# Patient Record
Sex: MALE | Race: WHITE | NOT HISPANIC OR LATINO
[De-identification: names, ages, dates, MRNs, and addresses within clinical notes are randomized per-mention and may not be internally consistent; named-entity substitution may affect disease eponyms.]

---

## 2017-01-12 ENCOUNTER — NON-APPOINTMENT (OUTPATIENT)
Age: 62
End: 2017-01-12

## 2017-01-12 ENCOUNTER — APPOINTMENT (OUTPATIENT)
Dept: CARDIOLOGY | Facility: CLINIC | Age: 62
End: 2017-01-12

## 2017-01-12 VITALS
BODY MASS INDEX: 30.61 KG/M2 | DIASTOLIC BLOOD PRESSURE: 80 MMHG | TEMPERATURE: 98.7 F | SYSTOLIC BLOOD PRESSURE: 124 MMHG | HEIGHT: 67 IN | OXYGEN SATURATION: 96 % | WEIGHT: 195 LBS | HEART RATE: 85 BPM

## 2017-01-12 RX ORDER — LEVETIRACETAM 500 MG/1
500 TABLET, FILM COATED ORAL TWICE DAILY
Qty: 180 | Refills: 3 | Status: DISCONTINUED | COMMUNITY
Start: 2017-01-12 | End: 2017-01-12

## 2017-01-16 LAB
ALBUMIN SERPL ELPH-MCNC: 4.2 G/DL
ALP BLD-CCNC: 77 U/L
ALT SERPL-CCNC: 15 U/L
ANION GAP SERPL CALC-SCNC: 12 MMOL/L
AST SERPL-CCNC: 19 U/L
BASOPHILS # BLD AUTO: 0 K/UL
BASOPHILS NFR BLD AUTO: 0 %
BILIRUB SERPL-MCNC: 0.3 MG/DL
BUN SERPL-MCNC: 18 MG/DL
CALCIUM SERPL-MCNC: 9.1 MG/DL
CHLORIDE SERPL-SCNC: 104 MMOL/L
CHOLEST SERPL-MCNC: 233 MG/DL
CHOLEST/HDLC SERPL: 3.3 RATIO
CO2 SERPL-SCNC: 26 MMOL/L
CREAT SERPL-MCNC: 1 MG/DL
EOSINOPHIL # BLD AUTO: 0.06 K/UL
EOSINOPHIL NFR BLD AUTO: 1.4 %
GLUCOSE SERPL-MCNC: 103 MG/DL
HBA1C MFR BLD HPLC: 5.5 %
HCT VFR BLD CALC: 43 %
HDLC SERPL-MCNC: 71 MG/DL
HGB BLD-MCNC: 14.4 G/DL
IMM GRANULOCYTES NFR BLD AUTO: 0 %
LDLC SERPL CALC-MCNC: 134 MG/DL
LYMPHOCYTES # BLD AUTO: 0.57 K/UL
LYMPHOCYTES NFR BLD AUTO: 13.6 %
MAN DIFF?: NORMAL
MCHC RBC-ENTMCNC: 30.7 PG
MCHC RBC-ENTMCNC: 33.5 GM/DL
MCV RBC AUTO: 91.7 FL
MONOCYTES # BLD AUTO: 0.35 K/UL
MONOCYTES NFR BLD AUTO: 8.3 %
NEUTROPHILS # BLD AUTO: 3.22 K/UL
NEUTROPHILS NFR BLD AUTO: 76.7 %
PLATELET # BLD AUTO: 143 K/UL
POTASSIUM SERPL-SCNC: 4.2 MMOL/L
PROT SERPL-MCNC: 6.8 G/DL
PSA FREE FLD-MCNC: 14.7 %
PSA FREE SERPL-MCNC: 0.09 NG/ML
PSA SERPL-MCNC: 0.63 NG/ML
RBC # BLD: 4.69 M/UL
RBC # FLD: 13.4 %
SODIUM SERPL-SCNC: 142 MMOL/L
T4 SERPL-MCNC: 6.8 UG/DL
TRIGL SERPL-MCNC: 139 MG/DL
TSH SERPL-ACNC: 2.66 UIU/ML
WBC # FLD AUTO: 4.2 K/UL

## 2017-03-20 ENCOUNTER — APPOINTMENT (OUTPATIENT)
Dept: INFECTIOUS DISEASE | Facility: CLINIC | Age: 62
End: 2017-03-20

## 2017-03-20 VITALS
TEMPERATURE: 98.2 F | DIASTOLIC BLOOD PRESSURE: 94 MMHG | BODY MASS INDEX: 30.29 KG/M2 | SYSTOLIC BLOOD PRESSURE: 148 MMHG | HEIGHT: 67 IN | OXYGEN SATURATION: 100 % | WEIGHT: 193 LBS | HEART RATE: 87 BPM

## 2017-03-24 LAB
ALBUMIN SERPL ELPH-MCNC: 4.1 G/DL
ALP BLD-CCNC: 76 U/L
ALT SERPL-CCNC: 12 U/L
ANION GAP SERPL CALC-SCNC: 17 MMOL/L
AST SERPL-CCNC: 16 U/L
BASOPHILS # BLD AUTO: 0.01 K/UL
BASOPHILS NFR BLD AUTO: 0.2 %
BILIRUB SERPL-MCNC: 0.3 MG/DL
BUN SERPL-MCNC: 21 MG/DL
CALCIUM SERPL-MCNC: 9.1 MG/DL
CHLORIDE SERPL-SCNC: 100 MMOL/L
CO2 SERPL-SCNC: 22 MMOL/L
CREAT SERPL-MCNC: 1.21 MG/DL
EOSINOPHIL # BLD AUTO: 0.09 K/UL
EOSINOPHIL NFR BLD AUTO: 1.7 %
GLUCOSE SERPL-MCNC: 95 MG/DL
HBV CORE IGG+IGM SER QL: REACTIVE
HBV SURFACE AB SER QL: NONREACTIVE
HBV SURFACE AG SER QL: NONREACTIVE
HCT VFR BLD CALC: 44.6 %
HCV AB SER QL: NONREACTIVE
HCV S/CO RATIO: 0.07 S/CO
HGB BLD-MCNC: 14.5 G/DL
HIV1+2 AB SPEC QL IA.RAPID: NONREACTIVE
IMM GRANULOCYTES NFR BLD AUTO: 0.4 %
INR PPP: 0.96 RATIO
LYMPHOCYTES # BLD AUTO: 0.56 K/UL
LYMPHOCYTES NFR BLD AUTO: 10.4 %
MAN DIFF?: NORMAL
MCHC RBC-ENTMCNC: 29.9 PG
MCHC RBC-ENTMCNC: 32.5 GM/DL
MCV RBC AUTO: 92 FL
MONOCYTES # BLD AUTO: 0.61 K/UL
MONOCYTES NFR BLD AUTO: 11.3 %
NEUTROPHILS # BLD AUTO: 4.1 K/UL
NEUTROPHILS NFR BLD AUTO: 76 %
PLATELET # BLD AUTO: 195 K/UL
POTASSIUM SERPL-SCNC: 4.1 MMOL/L
PROT SERPL-MCNC: 6.8 G/DL
PT BLD: 10.8 SEC
RBC # BLD: 4.85 M/UL
RBC # FLD: 13.7 %
RPR SER-TITR: ABNORMAL
SODIUM SERPL-SCNC: 139 MMOL/L
WBC # FLD AUTO: 5.39 K/UL

## 2017-03-28 ENCOUNTER — FORM ENCOUNTER (OUTPATIENT)
Age: 62
End: 2017-03-28

## 2017-03-29 ENCOUNTER — OUTPATIENT (OUTPATIENT)
Dept: OUTPATIENT SERVICES | Facility: HOSPITAL | Age: 62
LOS: 1 days | End: 2017-03-29
Payer: COMMERCIAL

## 2017-03-29 ENCOUNTER — APPOINTMENT (OUTPATIENT)
Dept: RADIOLOGY | Facility: HOSPITAL | Age: 62
End: 2017-03-29

## 2017-03-29 DIAGNOSIS — A52.3 NEUROSYPHILIS, UNSPECIFIED: ICD-10-CM

## 2017-03-29 LAB
APPEARANCE CSF: CLEAR — SIGNIFICANT CHANGE UP
COLOR CSF: SIGNIFICANT CHANGE UP
GLUCOSE CSF-MCNC: 55 MG/DL — SIGNIFICANT CHANGE UP (ref 40–70)
GRAM STN FLD: SIGNIFICANT CHANGE UP
LYMPHOCYTES # CSF: 87 % — HIGH (ref 40–80)
MONOS+MACROS NFR CSF: 13 % — LOW (ref 15–45)
NEUTROPHILS # CSF: 0 % — SIGNIFICANT CHANGE UP (ref 0–6)
NRBC NFR CSF: 1 /UL — SIGNIFICANT CHANGE UP (ref 0–5)
PROT CSF-MCNC: 48 MG/DL — HIGH (ref 15–45)
RBC # CSF: 0 /UL — SIGNIFICANT CHANGE UP (ref 0–0)
SPECIMEN SOURCE: SIGNIFICANT CHANGE UP
TUBE TYPE: SIGNIFICANT CHANGE UP

## 2017-03-29 PROCEDURE — 82945 GLUCOSE OTHER FLUID: CPT

## 2017-03-29 PROCEDURE — 89051 BODY FLUID CELL COUNT: CPT

## 2017-03-29 PROCEDURE — 87205 SMEAR GRAM STAIN: CPT

## 2017-03-29 PROCEDURE — 77003 FLUOROGUIDE FOR SPINE INJECT: CPT | Mod: 26

## 2017-03-29 PROCEDURE — 77003 FLUOROGUIDE FOR SPINE INJECT: CPT

## 2017-03-29 PROCEDURE — 87070 CULTURE OTHR SPECIMN AEROBIC: CPT

## 2017-03-29 PROCEDURE — 84157 ASSAY OF PROTEIN OTHER: CPT

## 2017-03-29 PROCEDURE — 62270 DX LMBR SPI PNXR: CPT

## 2017-03-29 PROCEDURE — 86592 SYPHILIS TEST NON-TREP QUAL: CPT

## 2017-03-30 LAB — VDRL CSF-TITR: POSITIVE

## 2017-04-01 LAB
CULTURE RESULTS: NO GROWTH — SIGNIFICANT CHANGE UP
SPECIMEN SOURCE: SIGNIFICANT CHANGE UP

## 2017-06-20 ENCOUNTER — APPOINTMENT (OUTPATIENT)
Dept: INFECTIOUS DISEASE | Facility: CLINIC | Age: 62
End: 2017-06-20

## 2017-06-20 VITALS
WEIGHT: 193 LBS | HEIGHT: 67 IN | BODY MASS INDEX: 30.29 KG/M2 | SYSTOLIC BLOOD PRESSURE: 116 MMHG | TEMPERATURE: 97.9 F | HEART RATE: 91 BPM | DIASTOLIC BLOOD PRESSURE: 84 MMHG | OXYGEN SATURATION: 99 %

## 2017-06-23 LAB
HBV DNA # SERPL NAA+PROBE: NOT DETECTED IU/ML
HEPB DNA PCR LOG: NOT DETECTED LOGIU/ML
RPR SER-TITR: ABNORMAL

## 2017-09-18 ENCOUNTER — APPOINTMENT (OUTPATIENT)
Dept: INFECTIOUS DISEASE | Facility: CLINIC | Age: 62
End: 2017-09-18
Payer: COMMERCIAL

## 2017-09-18 VITALS
SYSTOLIC BLOOD PRESSURE: 144 MMHG | BODY MASS INDEX: 29.82 KG/M2 | OXYGEN SATURATION: 100 % | HEIGHT: 67 IN | TEMPERATURE: 97.2 F | DIASTOLIC BLOOD PRESSURE: 81 MMHG | WEIGHT: 190 LBS | HEART RATE: 78 BPM

## 2017-09-18 PROCEDURE — 99213 OFFICE O/P EST LOW 20 MIN: CPT

## 2017-10-05 LAB — RPR SER-TITR: ABNORMAL

## 2017-12-19 ENCOUNTER — APPOINTMENT (OUTPATIENT)
Dept: INFECTIOUS DISEASE | Facility: CLINIC | Age: 62
End: 2017-12-19
Payer: COMMERCIAL

## 2017-12-19 VITALS
HEART RATE: 97 BPM | RESPIRATION RATE: 18 BRPM | OXYGEN SATURATION: 100 % | TEMPERATURE: 97 F | BODY MASS INDEX: 29.03 KG/M2 | WEIGHT: 185 LBS | HEIGHT: 67 IN | DIASTOLIC BLOOD PRESSURE: 98 MMHG | SYSTOLIC BLOOD PRESSURE: 147 MMHG

## 2017-12-19 PROCEDURE — 99213 OFFICE O/P EST LOW 20 MIN: CPT

## 2017-12-21 ENCOUNTER — MOBILE ON CALL (OUTPATIENT)
Age: 62
End: 2017-12-21

## 2017-12-22 LAB — RPR SER-TITR: ABNORMAL

## 2018-01-08 ENCOUNTER — APPOINTMENT (OUTPATIENT)
Dept: CARDIOLOGY | Facility: CLINIC | Age: 63
End: 2018-01-08
Payer: COMMERCIAL

## 2018-01-08 ENCOUNTER — NON-APPOINTMENT (OUTPATIENT)
Age: 63
End: 2018-01-08

## 2018-01-08 VITALS
DIASTOLIC BLOOD PRESSURE: 83 MMHG | SYSTOLIC BLOOD PRESSURE: 137 MMHG | BODY MASS INDEX: 29.45 KG/M2 | WEIGHT: 188 LBS | HEART RATE: 83 BPM

## 2018-01-08 PROCEDURE — 99214 OFFICE O/P EST MOD 30 MIN: CPT

## 2018-01-08 PROCEDURE — 93000 ELECTROCARDIOGRAM COMPLETE: CPT

## 2018-01-09 LAB
ALBUMIN SERPL ELPH-MCNC: 4.5 G/DL
ALP BLD-CCNC: 94 U/L
ALT SERPL-CCNC: 22 U/L
ANION GAP SERPL CALC-SCNC: 18 MMOL/L
AST SERPL-CCNC: 26 U/L
BASOPHILS # BLD AUTO: 0.01 K/UL
BASOPHILS NFR BLD AUTO: 0.2 %
BILIRUB SERPL-MCNC: 0.3 MG/DL
BUN SERPL-MCNC: 16 MG/DL
CALCIUM SERPL-MCNC: 9.8 MG/DL
CHLORIDE SERPL-SCNC: 99 MMOL/L
CHOLEST SERPL-MCNC: 228 MG/DL
CHOLEST/HDLC SERPL: 3 RATIO
CO2 SERPL-SCNC: 23 MMOL/L
CREAT SERPL-MCNC: 1.13 MG/DL
EOSINOPHIL # BLD AUTO: 0.07 K/UL
EOSINOPHIL NFR BLD AUTO: 1.4 %
ESTIMATED AVERAGE GLUCOSE: 103 MG/DL
GLUCOSE SERPL-MCNC: 92 MG/DL
HBA1C MFR BLD HPLC: 5.2 %
HCT VFR BLD CALC: 44.6 %
HDLC SERPL-MCNC: 77 MG/DL
HGB BLD-MCNC: 14.7 G/DL
IMM GRANULOCYTES NFR BLD AUTO: 0.2 %
LDLC SERPL CALC-MCNC: 121 MG/DL
LYMPHOCYTES # BLD AUTO: 0.65 K/UL
LYMPHOCYTES NFR BLD AUTO: 13.1 %
MAN DIFF?: NORMAL
MCHC RBC-ENTMCNC: 30.7 PG
MCHC RBC-ENTMCNC: 33 GM/DL
MCV RBC AUTO: 93.1 FL
MONOCYTES # BLD AUTO: 0.63 K/UL
MONOCYTES NFR BLD AUTO: 12.7 %
NEUTROPHILS # BLD AUTO: 3.61 K/UL
NEUTROPHILS NFR BLD AUTO: 72.4 %
PLATELET # BLD AUTO: 175 K/UL
POTASSIUM SERPL-SCNC: 4.4 MMOL/L
PROT SERPL-MCNC: 7.2 G/DL
PSA FREE FLD-MCNC: 14.3
PSA FREE SERPL-MCNC: 0.11 NG/ML
PSA SERPL-MCNC: 0.77 NG/ML
RBC # BLD: 4.79 M/UL
RBC # FLD: 13.1 %
SODIUM SERPL-SCNC: 140 MMOL/L
T4 SERPL-MCNC: 7.4 UG/DL
TRIGL SERPL-MCNC: 148 MG/DL
TSH SERPL-ACNC: 2.84 UIU/ML
WBC # FLD AUTO: 4.98 K/UL

## 2018-06-11 ENCOUNTER — APPOINTMENT (OUTPATIENT)
Dept: INFECTIOUS DISEASE | Facility: CLINIC | Age: 63
End: 2018-06-11
Payer: COMMERCIAL

## 2018-06-11 VITALS
SYSTOLIC BLOOD PRESSURE: 143 MMHG | HEIGHT: 67 IN | HEART RATE: 82 BPM | DIASTOLIC BLOOD PRESSURE: 89 MMHG | WEIGHT: 184 LBS | OXYGEN SATURATION: 100 % | TEMPERATURE: 97.4 F | BODY MASS INDEX: 28.88 KG/M2

## 2018-06-11 PROCEDURE — 99213 OFFICE O/P EST LOW 20 MIN: CPT

## 2018-06-15 LAB — RPR SER-TITR: ABNORMAL

## 2018-07-10 ENCOUNTER — INPATIENT (INPATIENT)
Facility: HOSPITAL | Age: 63
LOS: 7 days | Discharge: HOME | DRG: 494 | End: 2018-07-18
Attending: ORTHOPAEDIC SURGERY | Admitting: ORTHOPAEDIC SURGERY
Payer: COMMERCIAL

## 2018-07-10 VITALS
OXYGEN SATURATION: 100 % | SYSTOLIC BLOOD PRESSURE: 153 MMHG | WEIGHT: 184.97 LBS | HEIGHT: 68 IN | RESPIRATION RATE: 17 BRPM | DIASTOLIC BLOOD PRESSURE: 99 MMHG | HEART RATE: 89 BPM | TEMPERATURE: 98 F

## 2018-07-10 DIAGNOSIS — S82.143A DISPLACED BICONDYLAR FRACTURE OF UNSPECIFIED TIBIA, INITIAL ENCOUNTER FOR CLOSED FRACTURE: ICD-10-CM

## 2018-07-10 LAB
ALBUMIN SERPL ELPH-MCNC: 3.8 G/DL — SIGNIFICANT CHANGE UP (ref 3.3–5)
ALP SERPL-CCNC: 72 U/L — SIGNIFICANT CHANGE UP (ref 40–120)
ALT FLD-CCNC: 15 U/L — SIGNIFICANT CHANGE UP (ref 10–45)
ANION GAP SERPL CALC-SCNC: 10 MMOL/L — SIGNIFICANT CHANGE UP (ref 5–17)
APTT BLD: 26.5 SEC — LOW (ref 27.5–37.4)
AST SERPL-CCNC: 24 U/L — SIGNIFICANT CHANGE UP (ref 10–40)
BASOPHILS # BLD AUTO: 0 K/UL — SIGNIFICANT CHANGE UP (ref 0–0.2)
BASOPHILS NFR BLD AUTO: 0 % — SIGNIFICANT CHANGE UP (ref 0–2)
BILIRUB SERPL-MCNC: 0.5 MG/DL — SIGNIFICANT CHANGE UP (ref 0.2–1.2)
BLD GP AB SCN SERPL QL: NEGATIVE — SIGNIFICANT CHANGE UP
BUN SERPL-MCNC: 22 MG/DL — SIGNIFICANT CHANGE UP (ref 7–23)
CALCIUM SERPL-MCNC: 9.2 MG/DL — SIGNIFICANT CHANGE UP (ref 8.4–10.5)
CHLORIDE SERPL-SCNC: 104 MMOL/L — SIGNIFICANT CHANGE UP (ref 96–108)
CK SERPL-CCNC: 344 U/L — HIGH (ref 30–200)
CO2 SERPL-SCNC: 27 MMOL/L — SIGNIFICANT CHANGE UP (ref 22–31)
CREAT SERPL-MCNC: 1.08 MG/DL — SIGNIFICANT CHANGE UP (ref 0.5–1.3)
EOSINOPHIL # BLD AUTO: 0 K/UL — SIGNIFICANT CHANGE UP (ref 0–0.5)
EOSINOPHIL NFR BLD AUTO: 0.4 % — SIGNIFICANT CHANGE UP (ref 0–6)
GAS PNL BLDV: SIGNIFICANT CHANGE UP
GLUCOSE SERPL-MCNC: 113 MG/DL — HIGH (ref 70–99)
HCT VFR BLD CALC: 36.2 % — LOW (ref 39–50)
HGB BLD-MCNC: 12.7 G/DL — LOW (ref 13–17)
INR BLD: 1.05 RATIO — SIGNIFICANT CHANGE UP (ref 0.88–1.16)
LYMPHOCYTES # BLD AUTO: 0.4 K/UL — LOW (ref 1–3.3)
LYMPHOCYTES # BLD AUTO: 4.9 % — LOW (ref 13–44)
MCHC RBC-ENTMCNC: 32.9 PG — SIGNIFICANT CHANGE UP (ref 27–34)
MCHC RBC-ENTMCNC: 35.2 GM/DL — SIGNIFICANT CHANGE UP (ref 32–36)
MCV RBC AUTO: 93.5 FL — SIGNIFICANT CHANGE UP (ref 80–100)
MONOCYTES # BLD AUTO: 0.7 K/UL — SIGNIFICANT CHANGE UP (ref 0–0.9)
MONOCYTES NFR BLD AUTO: 8.5 % — SIGNIFICANT CHANGE UP (ref 2–14)
NEUTROPHILS # BLD AUTO: 6.8 K/UL — SIGNIFICANT CHANGE UP (ref 1.8–7.4)
NEUTROPHILS NFR BLD AUTO: 86.2 % — HIGH (ref 43–77)
PLATELET # BLD AUTO: 122 K/UL — LOW (ref 150–400)
POTASSIUM SERPL-MCNC: 4 MMOL/L — SIGNIFICANT CHANGE UP (ref 3.5–5.3)
POTASSIUM SERPL-SCNC: 4 MMOL/L — SIGNIFICANT CHANGE UP (ref 3.5–5.3)
PROT SERPL-MCNC: 6.7 G/DL — SIGNIFICANT CHANGE UP (ref 6–8.3)
PROTHROM AB SERPL-ACNC: 11.5 SEC — SIGNIFICANT CHANGE UP (ref 9.8–12.7)
RBC # BLD: 3.87 M/UL — LOW (ref 4.2–5.8)
RBC # FLD: 11.9 % — SIGNIFICANT CHANGE UP (ref 10.3–14.5)
RH IG SCN BLD-IMP: POSITIVE — SIGNIFICANT CHANGE UP
SODIUM SERPL-SCNC: 141 MMOL/L — SIGNIFICANT CHANGE UP (ref 135–145)
WBC # BLD: 7.9 K/UL — SIGNIFICANT CHANGE UP (ref 3.8–10.5)
WBC # FLD AUTO: 7.9 K/UL — SIGNIFICANT CHANGE UP (ref 3.8–10.5)

## 2018-07-10 PROCEDURE — 73562 X-RAY EXAM OF KNEE 3: CPT | Mod: 26,LT

## 2018-07-10 PROCEDURE — 99284 EMERGENCY DEPT VISIT MOD MDM: CPT | Mod: 25

## 2018-07-10 PROCEDURE — 76377 3D RENDER W/INTRP POSTPROCES: CPT | Mod: 26

## 2018-07-10 PROCEDURE — 73590 X-RAY EXAM OF LOWER LEG: CPT | Mod: 26,LT

## 2018-07-10 PROCEDURE — 73502 X-RAY EXAM HIP UNI 2-3 VIEWS: CPT | Mod: 26,LT

## 2018-07-10 PROCEDURE — 73630 X-RAY EXAM OF FOOT: CPT | Mod: 26,LT

## 2018-07-10 PROCEDURE — 73610 X-RAY EXAM OF ANKLE: CPT | Mod: 26,LT

## 2018-07-10 PROCEDURE — 73700 CT LOWER EXTREMITY W/O DYE: CPT | Mod: 26,LT

## 2018-07-10 PROCEDURE — 73552 X-RAY EXAM OF FEMUR 2/>: CPT | Mod: 26,LT

## 2018-07-10 PROCEDURE — 71045 X-RAY EXAM CHEST 1 VIEW: CPT | Mod: 26

## 2018-07-10 PROCEDURE — 93010 ELECTROCARDIOGRAM REPORT: CPT

## 2018-07-10 RX ORDER — HYDROMORPHONE HYDROCHLORIDE 2 MG/ML
1 INJECTION INTRAMUSCULAR; INTRAVENOUS; SUBCUTANEOUS EVERY 4 HOURS
Qty: 0 | Refills: 0 | Status: DISCONTINUED | OUTPATIENT
Start: 2018-07-10 | End: 2018-07-12

## 2018-07-10 RX ORDER — LORATADINE 10 MG/1
10 TABLET ORAL DAILY
Qty: 0 | Refills: 0 | Status: DISCONTINUED | OUTPATIENT
Start: 2018-07-10 | End: 2018-07-12

## 2018-07-10 RX ORDER — ACETAMINOPHEN 500 MG
1000 TABLET ORAL ONCE
Qty: 0 | Refills: 0 | Status: COMPLETED | OUTPATIENT
Start: 2018-07-10 | End: 2018-07-10

## 2018-07-10 RX ORDER — SODIUM CHLORIDE 9 MG/ML
1000 INJECTION INTRAMUSCULAR; INTRAVENOUS; SUBCUTANEOUS ONCE
Qty: 0 | Refills: 0 | Status: COMPLETED | OUTPATIENT
Start: 2018-07-10 | End: 2018-07-10

## 2018-07-10 RX ORDER — OXYCODONE HYDROCHLORIDE 5 MG/1
10 TABLET ORAL EVERY 4 HOURS
Qty: 0 | Refills: 0 | Status: DISCONTINUED | OUTPATIENT
Start: 2018-07-10 | End: 2018-07-12

## 2018-07-10 RX ORDER — ENOXAPARIN SODIUM 100 MG/ML
40 INJECTION SUBCUTANEOUS EVERY 24 HOURS
Qty: 0 | Refills: 0 | Status: DISCONTINUED | OUTPATIENT
Start: 2018-07-10 | End: 2018-07-11

## 2018-07-10 RX ORDER — ACETAMINOPHEN 500 MG
650 TABLET ORAL EVERY 6 HOURS
Qty: 0 | Refills: 0 | Status: DISCONTINUED | OUTPATIENT
Start: 2018-07-10 | End: 2018-07-12

## 2018-07-10 RX ORDER — SODIUM CHLORIDE 9 MG/ML
1000 INJECTION INTRAMUSCULAR; INTRAVENOUS; SUBCUTANEOUS
Qty: 0 | Refills: 0 | Status: DISCONTINUED | OUTPATIENT
Start: 2018-07-10 | End: 2018-07-10

## 2018-07-10 RX ORDER — MORPHINE SULFATE 50 MG/1
4 CAPSULE, EXTENDED RELEASE ORAL ONCE
Qty: 0 | Refills: 0 | Status: DISCONTINUED | OUTPATIENT
Start: 2018-07-10 | End: 2018-07-10

## 2018-07-10 RX ORDER — MONTELUKAST 4 MG/1
10 TABLET, CHEWABLE ORAL AT BEDTIME
Qty: 0 | Refills: 0 | Status: DISCONTINUED | OUTPATIENT
Start: 2018-07-10 | End: 2018-07-12

## 2018-07-10 RX ORDER — LEVETIRACETAM 250 MG/1
500 TABLET, FILM COATED ORAL
Qty: 0 | Refills: 0 | Status: DISCONTINUED | OUTPATIENT
Start: 2018-07-10 | End: 2018-07-10

## 2018-07-10 RX ORDER — OXYCODONE HYDROCHLORIDE 5 MG/1
5 TABLET ORAL EVERY 4 HOURS
Qty: 0 | Refills: 0 | Status: DISCONTINUED | OUTPATIENT
Start: 2018-07-10 | End: 2018-07-12

## 2018-07-10 RX ADMIN — MONTELUKAST 10 MILLIGRAM(S): 4 TABLET, CHEWABLE ORAL at 23:04

## 2018-07-10 RX ADMIN — MORPHINE SULFATE 4 MILLIGRAM(S): 50 CAPSULE, EXTENDED RELEASE ORAL at 19:09

## 2018-07-10 RX ADMIN — Medication 400 MILLIGRAM(S): at 19:08

## 2018-07-10 RX ADMIN — SODIUM CHLORIDE 1000 MILLILITER(S): 9 INJECTION INTRAMUSCULAR; INTRAVENOUS; SUBCUTANEOUS at 19:08

## 2018-07-10 RX ADMIN — ENOXAPARIN SODIUM 40 MILLIGRAM(S): 100 INJECTION SUBCUTANEOUS at 23:04

## 2018-07-10 NOTE — H&P ADULT - ATTENDING COMMENTS
Pt w L bicondylar proximal tibia fracture.  Due to swelling will need temporary spanning external fixation followed by ORIF once soft tissues amenable.  R/B/A discussed
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
## 2018-07-10 NOTE — H&P ADULT - ASSESSMENT
A/P: 62y Male with L tibial plateau fracture  Pain control  NWB LLE in [facility]  Medical clearance/optimization for OR  lovenox  FU dopplers  ex fix 7/12

## 2018-07-10 NOTE — H&P ADULT - NSHPPHYSICALEXAM_GEN_ALL_CORE
Physical Exam  Gen: NAD  LLE: skin intact, edema LLE, unable to SLR, unable to range knee secondary to pain, negative log roll, +ehl/fhl/ta/gs function, no calf ttp, dp/pt pulse intact, compartments soft    Secondary survey: benign, nv intact, able to SLR contralateral leg, negative log roll contralateral leg, no bony ttp elsewhere, bilateral upper extremity skin intact with no gross deformity, non tender to palpation over bony prominences, neurovascularly intact

## 2018-07-10 NOTE — ED PROVIDER NOTE - CARE PLAN
Principal Discharge DX:	Tibial plateau fracture Principal Discharge DX:	Closed fracture of left tibial plateau, initial encounter

## 2018-07-10 NOTE — ED PROVIDER NOTE - SHIFT CHANGE DETAILS
Received sign-out from Dr. Vickers regarding this patient awaiting Ortho consultation. This has since been completed and the patient has been admitted to their service. He appears well but requested pain medication which has been provided.

## 2018-07-10 NOTE — H&P ADULT - NSHPLABSRESULTS_GEN_ALL_CORE
12.7   7.9   )-----------( 122      ( 10 Jul 2018 16:28 )             36.2     10 Jul 2018 16:28    141    |  104    |  22     ----------------------------<  113    4.0     |  27     |  1.08     Ca    9.2        10 Jul 2018 16:28    TPro  6.7    /  Alb  3.8    /  TBili  0.5    /  DBili  x      /  AST  24     /  ALT  15     /  AlkPhos  72     10 Jul 2018 16:28    PT/INR - ( 10 Jul 2018 16:28 )   PT: 11.5 sec;   INR: 1.05 ratio         PTT - ( 10 Jul 2018 16:28 )  PTT:26.5 sec  Vital Signs Last 24 Hrs  T(C): 37 (07-10-18 @ 19:28), Max: 37 (07-10-18 @ 19:28)  T(F): 98.6 (07-10-18 @ 19:28), Max: 98.6 (07-10-18 @ 19:28)  HR: 97 (07-10-18 @ 19:28) (89 - 97)  BP: 129/78 (07-10-18 @ 19:28) (129/78 - 153/99)  BP(mean): --  RR: 18 (07-10-18 @ 19:28) (17 - 18)  SpO2: 95% (07-10-18 @ 19:28) (95% - 100%)    Imaging: XR were personally reviewed and demonstrates L tibial plateau fracture [place] 5

## 2018-07-10 NOTE — CONSULT NOTE ADULT - ATTENDING COMMENTS
The patient is medically stable, medically optimized and has no medical contraindication to surgery when edema decreases, as required. Exam time 70 minutes including > than 50 % for bedside discussion and counseling.

## 2018-07-10 NOTE — PATIENT PROFILE ADULT. - NS TRANSFER PATIENT BELONGINGS
Clothing/Jewelry/Money (specify)/Wrist Watch/watch, ring, wallet Wrist Watch/Jewelry/Money (specify)/Clothing/watch, ring, wallet, rosary, necklace
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
## 2018-07-11 ENCOUNTER — TRANSCRIPTION ENCOUNTER (OUTPATIENT)
Age: 63
End: 2018-07-11

## 2018-07-11 DIAGNOSIS — S82.142A DISPLACED BICONDYLAR FRACTURE OF LEFT TIBIA, INITIAL ENCOUNTER FOR CLOSED FRACTURE: ICD-10-CM

## 2018-07-11 DIAGNOSIS — B19.10 UNSPECIFIED VIRAL HEPATITIS B WITHOUT HEPATIC COMA: ICD-10-CM

## 2018-07-11 DIAGNOSIS — C18.9 MALIGNANT NEOPLASM OF COLON, UNSPECIFIED: ICD-10-CM

## 2018-07-11 DIAGNOSIS — A53.9 SYPHILIS, UNSPECIFIED: ICD-10-CM

## 2018-07-11 PROCEDURE — 93970 EXTREMITY STUDY: CPT | Mod: 26

## 2018-07-11 RX ORDER — ACETAMINOPHEN 500 MG
1000 TABLET ORAL ONCE
Qty: 0 | Refills: 0 | Status: DISCONTINUED | OUTPATIENT
Start: 2018-07-11 | End: 2018-07-12

## 2018-07-11 RX ORDER — SODIUM CHLORIDE 9 MG/ML
1000 INJECTION INTRAMUSCULAR; INTRAVENOUS; SUBCUTANEOUS
Qty: 0 | Refills: 0 | Status: DISCONTINUED | OUTPATIENT
Start: 2018-07-11 | End: 2018-07-12

## 2018-07-11 RX ORDER — CHLORHEXIDINE GLUCONATE 213 G/1000ML
1 SOLUTION TOPICAL ONCE
Qty: 0 | Refills: 0 | Status: COMPLETED | OUTPATIENT
Start: 2018-07-11 | End: 2018-07-11

## 2018-07-11 RX ORDER — ENOXAPARIN SODIUM 100 MG/ML
40 INJECTION SUBCUTANEOUS ONCE
Qty: 0 | Refills: 0 | Status: COMPLETED | OUTPATIENT
Start: 2018-07-11 | End: 2018-07-11

## 2018-07-11 RX ORDER — ONDANSETRON 8 MG/1
4 TABLET, FILM COATED ORAL EVERY 6 HOURS
Qty: 0 | Refills: 0 | Status: DISCONTINUED | OUTPATIENT
Start: 2018-07-11 | End: 2018-07-12

## 2018-07-11 RX ORDER — FAMOTIDINE 10 MG/ML
20 INJECTION INTRAVENOUS ONCE
Qty: 0 | Refills: 0 | Status: COMPLETED | OUTPATIENT
Start: 2018-07-12 | End: 2018-07-12

## 2018-07-11 RX ADMIN — OXYCODONE HYDROCHLORIDE 5 MILLIGRAM(S): 5 TABLET ORAL at 13:12

## 2018-07-11 RX ADMIN — OXYCODONE HYDROCHLORIDE 5 MILLIGRAM(S): 5 TABLET ORAL at 13:42

## 2018-07-11 RX ADMIN — OXYCODONE HYDROCHLORIDE 5 MILLIGRAM(S): 5 TABLET ORAL at 08:42

## 2018-07-11 RX ADMIN — ENOXAPARIN SODIUM 40 MILLIGRAM(S): 100 INJECTION SUBCUTANEOUS at 23:47

## 2018-07-11 RX ADMIN — MONTELUKAST 10 MILLIGRAM(S): 4 TABLET, CHEWABLE ORAL at 23:47

## 2018-07-11 RX ADMIN — OXYCODONE HYDROCHLORIDE 5 MILLIGRAM(S): 5 TABLET ORAL at 09:15

## 2018-07-11 NOTE — CHART NOTE - NSCHARTNOTEFT_GEN_A_CORE
Dx: L Tibial Plateau Fx  Procedure: Application of External Fixator L Leg  Dr Dawkins                          12.7   7.9   )-----------( 122      ( 10 Jul 2018 16:28 )             36.2       PT/INR - ( 10 Jul 2018 16:28 )   PT: 11.5 sec;   INR: 1.05 ratio         PTT - ( 10 Jul 2018 16:28 )  PTT:26.5 sec    07-10    141  |  104  |  22  ----------------------------<  113<H>  4.0   |  27  |  1.08    Ca    9.2      10 Jul 2018 16:28    TPro  6.7  /  Alb  3.8  /  TBili  0.5  /  DBili  x   /  AST  24  /  ALT  15  /  AlkPhos  72  07-10          Type and Screen: 07-10 @ 19:03 RH:Positive ABO:O Antibody:-- --  Type and Screen: 07-10 @ 18:45 RH:Positive ABO:O Antibody:Negative --      EKG: S/R @ 68 No ectopy    CXR: (-) I/E      History and Physical Complete? [x] Yes [ ] No    Consent Signed [x] Yes [ ] No    Clearance on chart? --PENDING    NPO Orders             Diet [ x] Yes [ ] No             IVF [x ] Yes [ ] No             AntiCoag Held [x ] Yes [ ] No             RX [x ] Yes [ ] No             Labs [ x] Yes [ ] No      ***See Above  Lenny WILSON  Orthopedics  B: [id number]  S: [id number] Dx: L Tibial Plateau Fx  Procedure: Application of External Fixator L Leg  Dr Dawkins                          12.7   7.9   )-----------( 122      ( 10 Jul 2018 16:28 )             36.2       PT/INR - ( 10 Jul 2018 16:28 )   PT: 11.5 sec;   INR: 1.05 ratio         PTT - ( 10 Jul 2018 16:28 )  PTT:26.5 sec    07-10    141  |  104  |  22  ----------------------------<  113<H>  4.0   |  27  |  1.08    Ca    9.2      10 Jul 2018 16:28    TPro  6.7  /  Alb  3.8  /  TBili  0.5  /  DBili  x   /  AST  24  /  ALT  15  /  AlkPhos  72  07-10        Type and Screen: 07-10 @ 19:03 RH:Positive ABO:O Antibody:-- --  Type and Screen: 07-10 @ 18:45 RH:Positive ABO:O Antibody:Negative --    EKG: S/R @ 68 No ectopy    CXR: (-) I/E     Dopplers BLE (-) DVT    History and Physical Complete? [x] Yes [ ] No    Consent Signed [x] Yes [ ] No    Clearance on chart? --PENDING    NPO Orders             Diet [ x] Yes [ ] No             IVF [x ] Yes [ ] No             AntiCoag Held [x ] Yes [ ] No             RX [x ] Yes [ ] No             Labs [ x] Yes [ ] No      ***See Above  Lenny WILSON  Orthopedics  B: [id number]  S: [phone]

## 2018-07-11 NOTE — PROGRESS NOTE ADULT - SUBJECTIVE AND OBJECTIVE BOX
Pt seen/examined. Doing well. Pain controlled. No acute overnight complaints or events.    T(C): 36.7 (07-11-18 @ 05:09), Max: 37.1 (07-10-18 @ 19:40)  HR: 75 (07-11-18 @ 05:09) (75 - 97)  BP: 133/75 (07-11-18 @ 05:09) (129/78 - 153/99)  RR: 16 (07-11-18 @ 05:09) (16 - 18)  SpO2: 98% (07-11-18 @ 05:09) (95% - 100%)  Wt(kg): --    - Gen: NAD  - LLE: [facility] C/D/I; SILT TN?SPN/DPN/SN; TA?EHL/gs intact; DP 2+    62yMale w/L S5 tibial plateau fx    - Pain control  - DVT ppx  - OOB/PT - NWB LLE  - OR tomorrow for ex fix vs ORIF
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
## 2018-07-11 NOTE — PROGRESS NOTE ADULT - PROBLEM SELECTOR PLAN 1
scheduled for evaluation for either an exfix of an ORIF  Ortho to decide  continue ICE to leg  Patient non weight bearing on the laft

## 2018-07-12 ENCOUNTER — APPOINTMENT (OUTPATIENT)
Dept: ORTHOPEDIC SURGERY | Facility: HOSPITAL | Age: 63
End: 2018-07-12

## 2018-07-12 LAB
ANION GAP SERPL CALC-SCNC: 10 MMOL/L — SIGNIFICANT CHANGE UP (ref 5–17)
APPEARANCE UR: CLEAR — SIGNIFICANT CHANGE UP
APTT BLD: 29.4 SEC — SIGNIFICANT CHANGE UP (ref 27.5–37.4)
BILIRUB UR-MCNC: NEGATIVE — SIGNIFICANT CHANGE UP
BLD GP AB SCN SERPL QL: NEGATIVE — SIGNIFICANT CHANGE UP
BUN SERPL-MCNC: 20 MG/DL — SIGNIFICANT CHANGE UP (ref 7–23)
CALCIUM SERPL-MCNC: 8.7 MG/DL — SIGNIFICANT CHANGE UP (ref 8.4–10.5)
CHLORIDE SERPL-SCNC: 102 MMOL/L — SIGNIFICANT CHANGE UP (ref 96–108)
CO2 SERPL-SCNC: 29 MMOL/L — SIGNIFICANT CHANGE UP (ref 22–31)
COLOR SPEC: YELLOW — SIGNIFICANT CHANGE UP
CREAT SERPL-MCNC: 0.88 MG/DL — SIGNIFICANT CHANGE UP (ref 0.5–1.3)
DIFF PNL FLD: NEGATIVE — SIGNIFICANT CHANGE UP
GLUCOSE SERPL-MCNC: 126 MG/DL — HIGH (ref 70–99)
GLUCOSE UR QL: NEGATIVE — SIGNIFICANT CHANGE UP
HCT VFR BLD CALC: 34.8 % — LOW (ref 39–50)
HGB BLD-MCNC: 11.7 G/DL — LOW (ref 13–17)
INR BLD: 1.04 RATIO — SIGNIFICANT CHANGE UP (ref 0.88–1.16)
KETONES UR-MCNC: NEGATIVE — SIGNIFICANT CHANGE UP
LEUKOCYTE ESTERASE UR-ACNC: NEGATIVE — SIGNIFICANT CHANGE UP
MCHC RBC-ENTMCNC: 31.4 PG — SIGNIFICANT CHANGE UP (ref 27–34)
MCHC RBC-ENTMCNC: 33.6 GM/DL — SIGNIFICANT CHANGE UP (ref 32–36)
MCV RBC AUTO: 93.3 FL — SIGNIFICANT CHANGE UP (ref 80–100)
NITRITE UR-MCNC: NEGATIVE — SIGNIFICANT CHANGE UP
PH UR: 6 — SIGNIFICANT CHANGE UP (ref 5–8)
PLATELET # BLD AUTO: 128 K/UL — LOW (ref 150–400)
POTASSIUM SERPL-MCNC: 4.1 MMOL/L — SIGNIFICANT CHANGE UP (ref 3.5–5.3)
POTASSIUM SERPL-SCNC: 4.1 MMOL/L — SIGNIFICANT CHANGE UP (ref 3.5–5.3)
PROT UR-MCNC: NEGATIVE — SIGNIFICANT CHANGE UP
PROTHROM AB SERPL-ACNC: 11.2 SEC — SIGNIFICANT CHANGE UP (ref 9.8–12.7)
RBC # BLD: 3.73 M/UL — LOW (ref 4.2–5.8)
RBC # FLD: 11.7 % — SIGNIFICANT CHANGE UP (ref 10.3–14.5)
RH IG SCN BLD-IMP: POSITIVE — SIGNIFICANT CHANGE UP
SODIUM SERPL-SCNC: 141 MMOL/L — SIGNIFICANT CHANGE UP (ref 135–145)
SP GR SPEC: 1.02 — SIGNIFICANT CHANGE UP (ref 1.01–1.02)
UROBILINOGEN FLD QL: NEGATIVE — SIGNIFICANT CHANGE UP
WBC # BLD: 5.2 K/UL — SIGNIFICANT CHANGE UP (ref 3.8–10.5)
WBC # FLD AUTO: 5.2 K/UL — SIGNIFICANT CHANGE UP (ref 3.8–10.5)

## 2018-07-12 PROCEDURE — 20690 APPL UNIPLN UNI EXT FIXJ SYS: CPT | Mod: LT

## 2018-07-12 PROCEDURE — 27532 TREAT KNEE FRACTURE: CPT | Mod: LT

## 2018-07-12 RX ORDER — ACETAMINOPHEN 500 MG
1000 TABLET ORAL ONCE
Qty: 0 | Refills: 0 | Status: COMPLETED | OUTPATIENT
Start: 2018-07-12 | End: 2018-07-12

## 2018-07-12 RX ORDER — LANOLIN ALCOHOL/MO/W.PET/CERES
3 CREAM (GRAM) TOPICAL AT BEDTIME
Qty: 0 | Refills: 0 | Status: DISCONTINUED | OUTPATIENT
Start: 2018-07-12 | End: 2018-07-16

## 2018-07-12 RX ORDER — ACETAMINOPHEN 500 MG
650 TABLET ORAL EVERY 6 HOURS
Qty: 0 | Refills: 0 | Status: DISCONTINUED | OUTPATIENT
Start: 2018-07-12 | End: 2018-07-16

## 2018-07-12 RX ORDER — OXYCODONE HYDROCHLORIDE 5 MG/1
5 TABLET ORAL EVERY 4 HOURS
Qty: 0 | Refills: 0 | Status: DISCONTINUED | OUTPATIENT
Start: 2018-07-12 | End: 2018-07-16

## 2018-07-12 RX ORDER — ENOXAPARIN SODIUM 100 MG/ML
40 INJECTION SUBCUTANEOUS EVERY 24 HOURS
Qty: 0 | Refills: 0 | Status: COMPLETED | OUTPATIENT
Start: 2018-07-13 | End: 2018-07-15

## 2018-07-12 RX ORDER — HYDROMORPHONE HYDROCHLORIDE 2 MG/ML
0.5 INJECTION INTRAMUSCULAR; INTRAVENOUS; SUBCUTANEOUS
Qty: 0 | Refills: 0 | Status: DISCONTINUED | OUTPATIENT
Start: 2018-07-12 | End: 2018-07-16

## 2018-07-12 RX ORDER — MAGNESIUM HYDROXIDE 400 MG/1
30 TABLET, CHEWABLE ORAL DAILY
Qty: 0 | Refills: 0 | Status: DISCONTINUED | OUTPATIENT
Start: 2018-07-12 | End: 2018-07-16

## 2018-07-12 RX ORDER — DIPHENHYDRAMINE HCL 50 MG
25 CAPSULE ORAL EVERY 4 HOURS
Qty: 0 | Refills: 0 | Status: DISCONTINUED | OUTPATIENT
Start: 2018-07-12 | End: 2018-07-16

## 2018-07-12 RX ORDER — SODIUM CHLORIDE 9 MG/ML
1000 INJECTION INTRAMUSCULAR; INTRAVENOUS; SUBCUTANEOUS
Qty: 0 | Refills: 0 | Status: DISCONTINUED | OUTPATIENT
Start: 2018-07-12 | End: 2018-07-18

## 2018-07-12 RX ORDER — MONTELUKAST 4 MG/1
10 TABLET, CHEWABLE ORAL AT BEDTIME
Qty: 0 | Refills: 0 | Status: DISCONTINUED | OUTPATIENT
Start: 2018-07-12 | End: 2018-07-16

## 2018-07-12 RX ORDER — BENZOCAINE AND MENTHOL 5; 1 G/100ML; G/100ML
1 LIQUID ORAL
Qty: 0 | Refills: 0 | Status: DISCONTINUED | OUTPATIENT
Start: 2018-07-12 | End: 2018-07-16

## 2018-07-12 RX ORDER — CEFAZOLIN SODIUM 1 G
2000 VIAL (EA) INJECTION EVERY 8 HOURS
Qty: 0 | Refills: 0 | Status: COMPLETED | OUTPATIENT
Start: 2018-07-12 | End: 2018-07-13

## 2018-07-12 RX ORDER — OXYCODONE HYDROCHLORIDE 5 MG/1
10 TABLET ORAL EVERY 4 HOURS
Qty: 0 | Refills: 0 | Status: DISCONTINUED | OUTPATIENT
Start: 2018-07-12 | End: 2018-07-16

## 2018-07-12 RX ORDER — DOCUSATE SODIUM 100 MG
100 CAPSULE ORAL THREE TIMES A DAY
Qty: 0 | Refills: 0 | Status: DISCONTINUED | OUTPATIENT
Start: 2018-07-12 | End: 2018-07-16

## 2018-07-12 RX ORDER — ONDANSETRON 8 MG/1
4 TABLET, FILM COATED ORAL EVERY 6 HOURS
Qty: 0 | Refills: 0 | Status: DISCONTINUED | OUTPATIENT
Start: 2018-07-12 | End: 2018-07-16

## 2018-07-12 RX ORDER — HYDROMORPHONE HYDROCHLORIDE 2 MG/ML
0.5 INJECTION INTRAMUSCULAR; INTRAVENOUS; SUBCUTANEOUS
Qty: 0 | Refills: 0 | Status: DISCONTINUED | OUTPATIENT
Start: 2018-07-12 | End: 2018-07-12

## 2018-07-12 RX ORDER — ONDANSETRON 8 MG/1
4 TABLET, FILM COATED ORAL ONCE
Qty: 0 | Refills: 0 | Status: DISCONTINUED | OUTPATIENT
Start: 2018-07-12 | End: 2018-07-12

## 2018-07-12 RX ORDER — LORATADINE 10 MG/1
10 TABLET ORAL DAILY
Qty: 0 | Refills: 0 | Status: DISCONTINUED | OUTPATIENT
Start: 2018-07-12 | End: 2018-07-12

## 2018-07-12 RX ADMIN — OXYCODONE HYDROCHLORIDE 5 MILLIGRAM(S): 5 TABLET ORAL at 22:59

## 2018-07-12 RX ADMIN — Medication 100 MILLIGRAM(S): at 22:59

## 2018-07-12 RX ADMIN — HYDROMORPHONE HYDROCHLORIDE 0.5 MILLIGRAM(S): 2 INJECTION INTRAMUSCULAR; INTRAVENOUS; SUBCUTANEOUS at 16:43

## 2018-07-12 RX ADMIN — SODIUM CHLORIDE 125 MILLILITER(S): 9 INJECTION INTRAMUSCULAR; INTRAVENOUS; SUBCUTANEOUS at 13:45

## 2018-07-12 RX ADMIN — HYDROMORPHONE HYDROCHLORIDE 0.5 MILLIGRAM(S): 2 INJECTION INTRAMUSCULAR; INTRAVENOUS; SUBCUTANEOUS at 17:13

## 2018-07-12 RX ADMIN — SODIUM CHLORIDE 75 MILLILITER(S): 9 INJECTION INTRAMUSCULAR; INTRAVENOUS; SUBCUTANEOUS at 17:01

## 2018-07-12 RX ADMIN — Medication 1000 MILLIGRAM(S): at 17:08

## 2018-07-12 RX ADMIN — Medication 400 MILLIGRAM(S): at 16:38

## 2018-07-12 RX ADMIN — MONTELUKAST 10 MILLIGRAM(S): 4 TABLET, CHEWABLE ORAL at 22:59

## 2018-07-12 NOTE — PROGRESS NOTE ADULT - SUBJECTIVE AND OBJECTIVE BOX
Pt seen/examined. Doing well. Pain controlled. No acute overnight complaints or events. denies cp/sob.    Vital Signs Last 24 Hrs  T(C): 36.7 (12 Jul 2018 04:53), Max: 37.1 (12 Jul 2018 00:46)  T(F): 98.1 (12 Jul 2018 04:53), Max: 98.8 (12 Jul 2018 00:46)  HR: 73 (12 Jul 2018 04:53) (73 - 90)  BP: 122/73 (12 Jul 2018 04:53) (122/73 - 150/79)  BP(mean): --  RR: 18 (12 Jul 2018 04:53) (16 - 18)  SpO2: 94% (12 Jul 2018 04:53) (94% - 99%)    - Gen: NAD  - LLE: BJKI C/D/I; SILT TN/SPN/DPN/SN; TA?EHL/gs intact; DP 2+    62yMale w/L S5 tibial plateau fx     - Pain control  - hold anticoag  - OOB/PT - NWB LLE  - ivf  -npo except meds  -plan for OR today
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
## 2018-07-12 NOTE — PROGRESS NOTE ADULT - PROBLEM SELECTOR PLAN 1
Patient seen s/p placment if Ex Fix  continue ICE to decrease swelling  keep leg elevated  pain meds as needed  DVT and GI prophylaxis

## 2018-07-12 NOTE — BRIEF OPERATIVE NOTE - PROCEDURE
<<-----Click on this checkbox to enter Procedure Application of external fixation device  07/12/2018  left leg  Active  KYNGSTROM

## 2018-07-12 NOTE — CHART NOTE - NSCHARTNOTEFT_GEN_A_CORE
07-12-18 @ 17:18    Pt comfortable.  Pain well controlled.  No chest pain, no SOB, no N/V.    T(C): 36 (07-12-18 @ 16:05), Max: 37.1 (07-12-18 @ 00:46)  HR: 78 (07-12-18 @ 17:00) (69 - 85)  BP: 141/75 (07-12-18 @ 17:00) (110/65 - 156/64)  RR: 18 (07-12-18 @ 17:00) (18 - 18)  SpO2: 96% (07-12-18 @ 17:00) (94% - 100%)    Left knee w distal pin site bloody drainage.  +DF/PF.  +Distal Pulses.  Motor/Sensory function grossly intact.  Calves soft/NT with Venodynes  intact.      Pt s/p Ex Fix L Tibia  -Analgesia  -Cont to Monitor  -DVT Prophylaxis    ALYSSIA Caicedo PA-C  Orthopaedic Surgery  [phone]

## 2018-07-12 NOTE — PROGRESS NOTE ADULT - SUBJECTIVE AND OBJECTIVE BOX
ORTHO ATTENDING POST OP    s/p Ex Fix L proximal tibia  NWB L  LE  elevation  Lovenox 40 QD  ANcef 1g x 24h  venodynes  CBC in RR and AM  OOB to chair  PT consult- crutch training  pin care starting 7/16 if not going to OR for ORIF

## 2018-07-13 LAB
ANION GAP SERPL CALC-SCNC: 11 MMOL/L — SIGNIFICANT CHANGE UP (ref 5–17)
BUN SERPL-MCNC: 14 MG/DL — SIGNIFICANT CHANGE UP (ref 7–23)
CALCIUM SERPL-MCNC: 8.6 MG/DL — SIGNIFICANT CHANGE UP (ref 8.4–10.5)
CHLORIDE SERPL-SCNC: 102 MMOL/L — SIGNIFICANT CHANGE UP (ref 96–108)
CO2 SERPL-SCNC: 27 MMOL/L — SIGNIFICANT CHANGE UP (ref 22–31)
CREAT SERPL-MCNC: 0.89 MG/DL — SIGNIFICANT CHANGE UP (ref 0.5–1.3)
GLUCOSE SERPL-MCNC: 134 MG/DL — HIGH (ref 70–99)
HCT VFR BLD CALC: 32.1 % — LOW (ref 39–50)
HGB BLD-MCNC: 10.6 G/DL — LOW (ref 13–17)
MCHC RBC-ENTMCNC: 29.9 PG — SIGNIFICANT CHANGE UP (ref 27–34)
MCHC RBC-ENTMCNC: 33 GM/DL — SIGNIFICANT CHANGE UP (ref 32–36)
MCV RBC AUTO: 90.4 FL — SIGNIFICANT CHANGE UP (ref 80–100)
PLATELET # BLD AUTO: 165 K/UL — SIGNIFICANT CHANGE UP (ref 150–400)
POTASSIUM SERPL-MCNC: 4.5 MMOL/L — SIGNIFICANT CHANGE UP (ref 3.5–5.3)
POTASSIUM SERPL-SCNC: 4.5 MMOL/L — SIGNIFICANT CHANGE UP (ref 3.5–5.3)
RBC # BLD: 3.55 M/UL — LOW (ref 4.2–5.8)
RBC # FLD: 13.1 % — SIGNIFICANT CHANGE UP (ref 10.3–14.5)
SODIUM SERPL-SCNC: 140 MMOL/L — SIGNIFICANT CHANGE UP (ref 135–145)
WBC # BLD: 5.63 K/UL — SIGNIFICANT CHANGE UP (ref 3.8–10.5)
WBC # FLD AUTO: 5.63 K/UL — SIGNIFICANT CHANGE UP (ref 3.8–10.5)

## 2018-07-13 PROCEDURE — 73700 CT LOWER EXTREMITY W/O DYE: CPT | Mod: 26,LT

## 2018-07-13 PROCEDURE — 76377 3D RENDER W/INTRP POSTPROCES: CPT | Mod: 26

## 2018-07-13 RX ADMIN — ENOXAPARIN SODIUM 40 MILLIGRAM(S): 100 INJECTION SUBCUTANEOUS at 06:25

## 2018-07-13 RX ADMIN — OXYCODONE HYDROCHLORIDE 5 MILLIGRAM(S): 5 TABLET ORAL at 00:00

## 2018-07-13 RX ADMIN — OXYCODONE HYDROCHLORIDE 10 MILLIGRAM(S): 5 TABLET ORAL at 09:37

## 2018-07-13 RX ADMIN — Medication 100 MILLIGRAM(S): at 06:25

## 2018-07-13 RX ADMIN — OXYCODONE HYDROCHLORIDE 10 MILLIGRAM(S): 5 TABLET ORAL at 17:57

## 2018-07-13 RX ADMIN — OXYCODONE HYDROCHLORIDE 10 MILLIGRAM(S): 5 TABLET ORAL at 08:07

## 2018-07-13 RX ADMIN — Medication 100 MILLIGRAM(S): at 11:29

## 2018-07-13 RX ADMIN — Medication 1 TABLET(S): at 11:29

## 2018-07-13 RX ADMIN — OXYCODONE HYDROCHLORIDE 10 MILLIGRAM(S): 5 TABLET ORAL at 18:27

## 2018-07-13 NOTE — PROGRESS NOTE ADULT - SUBJECTIVE AND OBJECTIVE BOX
Patient seen and examined. Pain controlled. No acute events overnight. denies numbness/tingling/cp/sob.    HEALTH ISSUES - PROBLEM Dx:  Syphilis: Syphilis  Hepatitis B: Hepatitis B  Colon cancer without distant metastasis: Colon cancer without distant metastasis  Closed fracture of left tibial plateau, initial encounter: Closed fracture of left tibial plateau, initial encounter        MEDICATIONS  (STANDING):  docusate sodium 100 milliGRAM(s) Oral three times a day  enoxaparin Injectable 40 milliGRAM(s) SubCutaneous every 24 hours  montelukast 10 milliGRAM(s) Oral at bedtime  multivitamin 1 Tablet(s) Oral daily  sodium chloride 0.9%. 1000 milliLiter(s) IV Continuous <Continuous>    Allergies    No Known Allergies    Intolerances                            11.7   5.2   )-----------( 128      ( 12 Jul 2018 05:08 )             34.8     12 Jul 2018 05:08    141    |  102    |  20     ----------------------------<  126    4.1     |  29     |  0.88     Ca    8.7        12 Jul 2018 05:08      PT/INR - ( 12 Jul 2018 05:08 )   PT: 11.2 sec;   INR: 1.04 ratio         PTT - ( 12 Jul 2018 05:08 )  PTT:29.4 sec  Vital Signs Last 24 Hrs  T(C): 36.6 (07-13-18 @ 04:30), Max: 36.9 (07-12-18 @ 20:00)  T(F): 97.9 (07-13-18 @ 04:30), Max: 98.4 (07-12-18 @ 20:00)  HR: 82 (07-13-18 @ 04:30) (69 - 95)  BP: 125/72 (07-13-18 @ 04:30) (110/65 - 156/76)  BP(mean): 109 (07-12-18 @ 17:15) (83 - 109)  RR: 18 (07-13-18 @ 04:30) (18 - 18)  SpO2: 100% (07-13-18 @ 04:30) (96% - 100%)    Physical Exam  Gen: NAD  LLE:   ex fix in place  pin sites covered w xeroform/andres, distal andres saturated with blood, changed to clean/dry andres  +ehl/fhl/ta/gs function  L2-S1 silt  Dp/pt pulse intact  No calf ttp  Compartments soft    A/P: 62y Male sp LLE Ex Fix for Tibial plateau fracture POD 1  Pain control  DVT ppx  PT/NWB LLE/OOB  FU labs  Ice/elevate  Medical management appreciated  Incentive spirometry  no pin care at this time  plan for OR on monday 7/16 for DIPTI/ORIF
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
## 2018-07-13 NOTE — PROGRESS NOTE ADULT - ASSESSMENT
63 yo male with a left tibial plateau fx 61 yo male with a left tibial plateau fx currently in an Exfix awaiting swelling to go down in order to proceed with ORIF.

## 2018-07-13 NOTE — PROGRESS NOTE ADULT - PROBLEM SELECTOR PLAN 1
Patient seen s/p placment if Ex Fix  continue ICE to decrease swelling  keep leg elevated  pain meds as needed  DVT and GI prophylaxis Patient seen s/p placement of Ex Fix  continue ICE to decrease swelling  keep leg elevated  pain meds as needed  DVT and GI prophylaxis

## 2018-07-14 LAB
ANION GAP SERPL CALC-SCNC: 9 MMOL/L — SIGNIFICANT CHANGE UP (ref 5–17)
BUN SERPL-MCNC: 18 MG/DL — SIGNIFICANT CHANGE UP (ref 7–23)
CALCIUM SERPL-MCNC: 8.6 MG/DL — SIGNIFICANT CHANGE UP (ref 8.4–10.5)
CHLORIDE SERPL-SCNC: 100 MMOL/L — SIGNIFICANT CHANGE UP (ref 96–108)
CO2 SERPL-SCNC: 30 MMOL/L — SIGNIFICANT CHANGE UP (ref 22–31)
CREAT SERPL-MCNC: 0.99 MG/DL — SIGNIFICANT CHANGE UP (ref 0.5–1.3)
GLUCOSE SERPL-MCNC: 108 MG/DL — HIGH (ref 70–99)
POTASSIUM SERPL-MCNC: 4.4 MMOL/L — SIGNIFICANT CHANGE UP (ref 3.5–5.3)
POTASSIUM SERPL-SCNC: 4.4 MMOL/L — SIGNIFICANT CHANGE UP (ref 3.5–5.3)
SODIUM SERPL-SCNC: 139 MMOL/L — SIGNIFICANT CHANGE UP (ref 135–145)

## 2018-07-14 PROCEDURE — 93970 EXTREMITY STUDY: CPT | Mod: 26

## 2018-07-14 RX ADMIN — OXYCODONE HYDROCHLORIDE 10 MILLIGRAM(S): 5 TABLET ORAL at 16:22

## 2018-07-14 RX ADMIN — MONTELUKAST 10 MILLIGRAM(S): 4 TABLET, CHEWABLE ORAL at 23:07

## 2018-07-14 RX ADMIN — MONTELUKAST 10 MILLIGRAM(S): 4 TABLET, CHEWABLE ORAL at 00:04

## 2018-07-14 RX ADMIN — OXYCODONE HYDROCHLORIDE 10 MILLIGRAM(S): 5 TABLET ORAL at 23:37

## 2018-07-14 RX ADMIN — OXYCODONE HYDROCHLORIDE 10 MILLIGRAM(S): 5 TABLET ORAL at 23:07

## 2018-07-14 RX ADMIN — Medication 1 TABLET(S): at 11:28

## 2018-07-14 RX ADMIN — Medication 100 MILLIGRAM(S): at 00:06

## 2018-07-14 RX ADMIN — OXYCODONE HYDROCHLORIDE 10 MILLIGRAM(S): 5 TABLET ORAL at 00:35

## 2018-07-14 RX ADMIN — OXYCODONE HYDROCHLORIDE 10 MILLIGRAM(S): 5 TABLET ORAL at 00:04

## 2018-07-14 RX ADMIN — OXYCODONE HYDROCHLORIDE 10 MILLIGRAM(S): 5 TABLET ORAL at 07:21

## 2018-07-14 RX ADMIN — OXYCODONE HYDROCHLORIDE 10 MILLIGRAM(S): 5 TABLET ORAL at 11:28

## 2018-07-14 RX ADMIN — Medication 100 MILLIGRAM(S): at 16:21

## 2018-07-14 RX ADMIN — ENOXAPARIN SODIUM 40 MILLIGRAM(S): 100 INJECTION SUBCUTANEOUS at 06:51

## 2018-07-14 RX ADMIN — OXYCODONE HYDROCHLORIDE 10 MILLIGRAM(S): 5 TABLET ORAL at 12:00

## 2018-07-14 RX ADMIN — Medication 100 MILLIGRAM(S): at 23:07

## 2018-07-14 RX ADMIN — OXYCODONE HYDROCHLORIDE 10 MILLIGRAM(S): 5 TABLET ORAL at 06:51

## 2018-07-14 NOTE — PROGRESS NOTE ADULT - ASSESSMENT
63 yo male with a left tibial plateau fx currently in an Exfix awaiting swelling to go down in order to proceed with ORIF.

## 2018-07-14 NOTE — PROGRESS NOTE ADULT - SUBJECTIVE AND OBJECTIVE BOX
Patient resting without complaints.  Denies chest pain, SOB, N/V.    T(C): 37.1 (07-14-18 @ 04:59)  T(F): 98.7 (07-14-18 @ 04:59)  HR: 102 (07-13-18 @ 23:55)  BP: 120/69 (07-14-18 @ 04:59)  RR: 18 (07-14-18 @ 04:59)  SpO2: 100% (07-14-18 @ 04:59)      Exam:  Alert and Oriented, No Acute Distress    L lower Extremity:  Ex-fix in place without drainage from pin sites; Dsgs CDI  Thigh & calf moderately swollen  Calf moderately TTP; compartments soft & compressible  +DF/PF/EHL/FHL  Sensation grossly intact  Digits WWP; Cap refill <2secs                                                                                      10.6   5.63  )-----------( 165      ( 13 Jul 2018 07:53 )             32.1        139  |  100  |  18  ----------------------------<  108<H>  4.4   |  30  |  0.99      A/P: 62y Male s/p L tibial plateau ex-fix, POD 2; Stable  -Pain Control; Ice prn; Elevate  -DVT PPx; IS  -FU B/L PRETTY laird  -VIDYA LLE  -Continue Current Tx.      Elizabeth Pool PA-C  Orthopedic Surgery  Pagers [phone]
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
## 2018-07-14 NOTE — PROGRESS NOTE ADULT - PROBLEM SELECTOR PLAN 1
Patient seen s/p placement of Ex Fix  continue ICE to decrease swelling  keep leg elevated  pain meds as needed  DVT and GI prophylaxis

## 2018-07-15 ENCOUNTER — TRANSCRIPTION ENCOUNTER (OUTPATIENT)
Age: 63
End: 2018-07-15

## 2018-07-15 RX ORDER — CHLORHEXIDINE GLUCONATE 213 G/1000ML
1 SOLUTION TOPICAL ONCE
Qty: 0 | Refills: 0 | Status: COMPLETED | OUTPATIENT
Start: 2018-07-15 | End: 2018-07-16

## 2018-07-15 RX ORDER — SODIUM CHLORIDE 9 MG/ML
1000 INJECTION, SOLUTION INTRAVENOUS
Qty: 0 | Refills: 0 | Status: DISCONTINUED | OUTPATIENT
Start: 2018-07-15 | End: 2018-07-16

## 2018-07-15 RX ADMIN — Medication 100 MILLIGRAM(S): at 22:21

## 2018-07-15 RX ADMIN — OXYCODONE HYDROCHLORIDE 10 MILLIGRAM(S): 5 TABLET ORAL at 19:46

## 2018-07-15 RX ADMIN — OXYCODONE HYDROCHLORIDE 10 MILLIGRAM(S): 5 TABLET ORAL at 22:52

## 2018-07-15 RX ADMIN — OXYCODONE HYDROCHLORIDE 10 MILLIGRAM(S): 5 TABLET ORAL at 22:22

## 2018-07-15 RX ADMIN — Medication 100 MILLIGRAM(S): at 14:02

## 2018-07-15 RX ADMIN — OXYCODONE HYDROCHLORIDE 10 MILLIGRAM(S): 5 TABLET ORAL at 09:12

## 2018-07-15 RX ADMIN — OXYCODONE HYDROCHLORIDE 10 MILLIGRAM(S): 5 TABLET ORAL at 10:00

## 2018-07-15 RX ADMIN — Medication 1 TABLET(S): at 14:01

## 2018-07-15 RX ADMIN — ENOXAPARIN SODIUM 40 MILLIGRAM(S): 100 INJECTION SUBCUTANEOUS at 06:40

## 2018-07-15 RX ADMIN — OXYCODONE HYDROCHLORIDE 10 MILLIGRAM(S): 5 TABLET ORAL at 18:22

## 2018-07-15 RX ADMIN — MONTELUKAST 10 MILLIGRAM(S): 4 TABLET, CHEWABLE ORAL at 22:21

## 2018-07-15 RX ADMIN — Medication 100 MILLIGRAM(S): at 06:40
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
## 2018-07-15 NOTE — PROGRESS NOTE ADULT - SUBJECTIVE AND OBJECTIVE BOX
Patient resting without complaints. Pain well controlled. Was able to sleep overnight.  No chest pain, SOB, N/V, palpitations.     T(C): 37.1 (07-15-18 @ 05:34), Max: 37.6 (07-14-18 @ 15:50)  HR: 84 (07-15-18 @ 05:34) (84 - 94)  BP: 112/62 (07-15-18 @ 05:34) (101/64 - 153/79)  RR: 18 (07-15-18 @ 05:34) (18 - 18)  SpO2: 95% (07-15-18 @ 05:34) (94% - 98%)      Exam:  Alert and Oriented, No Acute Distress  Pulm: CTAB  Lower Extremities:  LLE: Nonpitting edema, Ex-Fix in place, compartments soft, DP2+ bounding pulse, toes warm and mobile with brisk cap refill, +DF/PF weak due to pain      Labs:                   10.6   5.63  )-----------( 165      ( 13 Jul 2018 07:53 )             32.1    07-14    139  |  100  |  18  ----------------------------<  108<H>  4.4   |  30  |  0.99    Ca    8.6      14 Jul 2018 05:52

## 2018-07-15 NOTE — PROGRESS NOTE ADULT - ASSESSMENT
63 yo male with a left tibial plateau fx currently in an Exfix awaiting swelling to go down in order to proceed with ORIF. 63 yo male with a left tibial plateau fx currently in an Exfix, awaiting swelling to decrease, in order to proceed with ORIF.

## 2018-07-15 NOTE — OCCUPATIONAL THERAPY INITIAL EVALUATION ADULT - DIAGNOSIS, OT EVAL
Patient with decreased ADL status and impairments with functional mobility due to Pt demonstrates post op pain, decreased strength, balance, ADLs and functional mobility

## 2018-07-15 NOTE — OCCUPATIONAL THERAPY INITIAL EVALUATION ADULT - LIVES WITH, PROFILE
Pt lives alone in private home with 3 steps to enter, stairs to negotiate indoors. Pt I in ADLs and ambulation prior to admission/alone

## 2018-07-15 NOTE — PROGRESS NOTE ADULT - ASSESSMENT
A/P: 61 y/o M s/p L Tib Plateau Fx ORIF     DVT ppx-Lovenox  NWB LLE  Awaiting LLE Doppler results  DASH Diet  Pain management prn  Possible ORIF 7/16/18  Plan as per attending      KATIE Rosario  Orthopedic Surgery  [phone]
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
## 2018-07-16 LAB
ANION GAP SERPL CALC-SCNC: 10 MMOL/L — SIGNIFICANT CHANGE UP (ref 5–17)
APTT BLD: 27.6 SEC — SIGNIFICANT CHANGE UP (ref 27.5–37.4)
BLD GP AB SCN SERPL QL: NEGATIVE — SIGNIFICANT CHANGE UP
BUN SERPL-MCNC: 19 MG/DL — SIGNIFICANT CHANGE UP (ref 7–23)
CALCIUM SERPL-MCNC: 9.3 MG/DL — SIGNIFICANT CHANGE UP (ref 8.4–10.5)
CHLORIDE SERPL-SCNC: 94 MMOL/L — LOW (ref 96–108)
CO2 SERPL-SCNC: 29 MMOL/L — SIGNIFICANT CHANGE UP (ref 22–31)
CREAT SERPL-MCNC: 1 MG/DL — SIGNIFICANT CHANGE UP (ref 0.5–1.3)
GLUCOSE SERPL-MCNC: 116 MG/DL — HIGH (ref 70–99)
HCT VFR BLD CALC: 34.5 % — LOW (ref 39–50)
HGB BLD-MCNC: 12.2 G/DL — LOW (ref 13–17)
INR BLD: 1.1 RATIO — SIGNIFICANT CHANGE UP (ref 0.88–1.16)
MCHC RBC-ENTMCNC: 32.9 PG — SIGNIFICANT CHANGE UP (ref 27–34)
MCHC RBC-ENTMCNC: 35.2 GM/DL — SIGNIFICANT CHANGE UP (ref 32–36)
MCV RBC AUTO: 93.3 FL — SIGNIFICANT CHANGE UP (ref 80–100)
PLATELET # BLD AUTO: 200 K/UL — SIGNIFICANT CHANGE UP (ref 150–400)
POTASSIUM SERPL-MCNC: 4.3 MMOL/L — SIGNIFICANT CHANGE UP (ref 3.5–5.3)
POTASSIUM SERPL-SCNC: 4.3 MMOL/L — SIGNIFICANT CHANGE UP (ref 3.5–5.3)
PROTHROM AB SERPL-ACNC: 11.9 SEC — SIGNIFICANT CHANGE UP (ref 9.8–12.7)
RBC # BLD: 3.7 M/UL — LOW (ref 4.2–5.8)
RBC # FLD: 11.7 % — SIGNIFICANT CHANGE UP (ref 10.3–14.5)
RH IG SCN BLD-IMP: POSITIVE — SIGNIFICANT CHANGE UP
SODIUM SERPL-SCNC: 133 MMOL/L — LOW (ref 135–145)
WBC # BLD: 7.2 K/UL — SIGNIFICANT CHANGE UP (ref 3.8–10.5)
WBC # FLD AUTO: 7.2 K/UL — SIGNIFICANT CHANGE UP (ref 3.8–10.5)

## 2018-07-16 PROCEDURE — 20694 RMVL EXT FIXJ SYS UNDER ANES: CPT | Mod: 58,LT

## 2018-07-16 PROCEDURE — 27536 TREAT KNEE FRACTURE: CPT | Mod: 58,LT

## 2018-07-16 RX ORDER — ONDANSETRON 8 MG/1
4 TABLET, FILM COATED ORAL ONCE
Qty: 0 | Refills: 0 | Status: DISCONTINUED | OUTPATIENT
Start: 2018-07-16 | End: 2018-07-16

## 2018-07-16 RX ORDER — HYDROMORPHONE HYDROCHLORIDE 2 MG/ML
0.5 INJECTION INTRAMUSCULAR; INTRAVENOUS; SUBCUTANEOUS
Qty: 0 | Refills: 0 | Status: DISCONTINUED | OUTPATIENT
Start: 2018-07-16 | End: 2018-07-18

## 2018-07-16 RX ORDER — ENOXAPARIN SODIUM 100 MG/ML
40 INJECTION SUBCUTANEOUS EVERY 24 HOURS
Qty: 0 | Refills: 0 | Status: DISCONTINUED | OUTPATIENT
Start: 2018-07-17 | End: 2018-07-18

## 2018-07-16 RX ORDER — DIPHENHYDRAMINE HCL 50 MG
25 CAPSULE ORAL EVERY 4 HOURS
Qty: 0 | Refills: 0 | Status: DISCONTINUED | OUTPATIENT
Start: 2018-07-16 | End: 2018-07-18

## 2018-07-16 RX ORDER — CEFAZOLIN SODIUM 1 G
2000 VIAL (EA) INJECTION EVERY 8 HOURS
Qty: 0 | Refills: 0 | Status: COMPLETED | OUTPATIENT
Start: 2018-07-16 | End: 2018-07-17

## 2018-07-16 RX ORDER — BENZOCAINE AND MENTHOL 5; 1 G/100ML; G/100ML
1 LIQUID ORAL
Qty: 0 | Refills: 0 | Status: DISCONTINUED | OUTPATIENT
Start: 2018-07-16 | End: 2018-07-18

## 2018-07-16 RX ORDER — SODIUM CHLORIDE 9 MG/ML
1000 INJECTION INTRAMUSCULAR; INTRAVENOUS; SUBCUTANEOUS
Qty: 0 | Refills: 0 | Status: DISCONTINUED | OUTPATIENT
Start: 2018-07-16 | End: 2018-07-18

## 2018-07-16 RX ORDER — MAGNESIUM HYDROXIDE 400 MG/1
30 TABLET, CHEWABLE ORAL DAILY
Qty: 0 | Refills: 0 | Status: DISCONTINUED | OUTPATIENT
Start: 2018-07-16 | End: 2018-07-18

## 2018-07-16 RX ORDER — ACETAMINOPHEN 500 MG
650 TABLET ORAL EVERY 6 HOURS
Qty: 0 | Refills: 0 | Status: DISCONTINUED | OUTPATIENT
Start: 2018-07-16 | End: 2018-07-18

## 2018-07-16 RX ORDER — OXYCODONE HYDROCHLORIDE 5 MG/1
5 TABLET ORAL EVERY 4 HOURS
Qty: 0 | Refills: 0 | Status: DISCONTINUED | OUTPATIENT
Start: 2018-07-16 | End: 2018-07-18

## 2018-07-16 RX ORDER — LANOLIN ALCOHOL/MO/W.PET/CERES
3 CREAM (GRAM) TOPICAL AT BEDTIME
Qty: 0 | Refills: 0 | Status: DISCONTINUED | OUTPATIENT
Start: 2018-07-16 | End: 2018-07-18

## 2018-07-16 RX ORDER — DOCUSATE SODIUM 100 MG
100 CAPSULE ORAL THREE TIMES A DAY
Qty: 0 | Refills: 0 | Status: DISCONTINUED | OUTPATIENT
Start: 2018-07-16 | End: 2018-07-18

## 2018-07-16 RX ORDER — OXYCODONE HYDROCHLORIDE 5 MG/1
10 TABLET ORAL EVERY 4 HOURS
Qty: 0 | Refills: 0 | Status: DISCONTINUED | OUTPATIENT
Start: 2018-07-16 | End: 2018-07-18

## 2018-07-16 RX ORDER — HYDROMORPHONE HYDROCHLORIDE 2 MG/ML
0.5 INJECTION INTRAMUSCULAR; INTRAVENOUS; SUBCUTANEOUS
Qty: 0 | Refills: 0 | Status: DISCONTINUED | OUTPATIENT
Start: 2018-07-16 | End: 2018-07-16

## 2018-07-16 RX ORDER — ONDANSETRON 8 MG/1
4 TABLET, FILM COATED ORAL EVERY 6 HOURS
Qty: 0 | Refills: 0 | Status: DISCONTINUED | OUTPATIENT
Start: 2018-07-16 | End: 2018-07-18

## 2018-07-16 RX ORDER — MONTELUKAST 4 MG/1
10 TABLET, CHEWABLE ORAL AT BEDTIME
Qty: 0 | Refills: 0 | Status: DISCONTINUED | OUTPATIENT
Start: 2018-07-16 | End: 2018-07-18

## 2018-07-16 RX ADMIN — Medication 100 MILLIGRAM(S): at 20:37

## 2018-07-16 RX ADMIN — OXYCODONE HYDROCHLORIDE 10 MILLIGRAM(S): 5 TABLET ORAL at 20:37

## 2018-07-16 RX ADMIN — SODIUM CHLORIDE 100 MILLILITER(S): 9 INJECTION, SOLUTION INTRAVENOUS at 05:25

## 2018-07-16 RX ADMIN — HYDROMORPHONE HYDROCHLORIDE 0.5 MILLIGRAM(S): 2 INJECTION INTRAMUSCULAR; INTRAVENOUS; SUBCUTANEOUS at 17:39

## 2018-07-16 RX ADMIN — OXYCODONE HYDROCHLORIDE 10 MILLIGRAM(S): 5 TABLET ORAL at 04:06

## 2018-07-16 RX ADMIN — Medication 1 TABLET(S): at 11:54

## 2018-07-16 RX ADMIN — SODIUM CHLORIDE 75 MILLILITER(S): 9 INJECTION INTRAMUSCULAR; INTRAVENOUS; SUBCUTANEOUS at 17:10

## 2018-07-16 RX ADMIN — Medication 100 MILLIGRAM(S): at 23:33

## 2018-07-16 RX ADMIN — MONTELUKAST 10 MILLIGRAM(S): 4 TABLET, CHEWABLE ORAL at 23:32

## 2018-07-16 RX ADMIN — CHLORHEXIDINE GLUCONATE 1 APPLICATION(S): 213 SOLUTION TOPICAL at 11:37

## 2018-07-16 RX ADMIN — OXYCODONE HYDROCHLORIDE 10 MILLIGRAM(S): 5 TABLET ORAL at 11:55

## 2018-07-16 RX ADMIN — OXYCODONE HYDROCHLORIDE 10 MILLIGRAM(S): 5 TABLET ORAL at 21:07

## 2018-07-16 RX ADMIN — OXYCODONE HYDROCHLORIDE 10 MILLIGRAM(S): 5 TABLET ORAL at 04:36

## 2018-07-16 RX ADMIN — HYDROMORPHONE HYDROCHLORIDE 0.5 MILLIGRAM(S): 2 INJECTION INTRAMUSCULAR; INTRAVENOUS; SUBCUTANEOUS at 17:15

## 2018-07-16 NOTE — PRE-OP CHECKLIST - IV STARTED
yes
yes
[facility]/no
no/received from unit with tom in place

## 2018-07-16 NOTE — PROGRESS NOTE ADULT - SUBJECTIVE AND OBJECTIVE BOX
ORTHO ATTENDING POST OP    s/p ORIF L  proximal tibia  Elizabeth dressing  knee immobilizer  NWB  L  LE  elevation  Lovenox 40 QD  ANcef 1g x 24h  venodynes  CBC in RR and AM  OOB to chair  PT consult  f/u 2 weeks

## 2018-07-16 NOTE — PROGRESS NOTE ADULT - ATTENDING COMMENTS
The patient is medically stable, medically optimized and has no medical contraindication to surgery tomorrow as required. Exam time 40 minutes including > than 50 % for bedside discussion and counseling.

## 2018-07-16 NOTE — PROGRESS NOTE ADULT - SUBJECTIVE AND OBJECTIVE BOX
ORTHO POC NOTE      Resting c/o 7/10 pain. No Chest Pain, SOB, N/V. Has not voided yet.    T(C): 36.4 (07-16-18 @ 18:00), Max: 37 (07-16-18 @ 00:38)  HR: 93 (07-16-18 @ 18:00) (83 - 368)  BP: 122/75 (07-16-18 @ 18:00) (113/60 - 143/75)  RR: 15 (07-16-18 @ 18:00) (14 - 20)  SpO2: 100% (07-16-18 @ 18:00) (96% - 100%)      Exam:   Alert and Oriented; No Acute Distress  Card: +S1/S2, RRR  Pulm: CTAB  Ext: LLE: ACE dressing C/D/I, Toes warm and mobile with dull sensation grossly intact to light touch, DP2+, +DF/PF        Post op Xray: In chart                           12.2   7.2   )-----------( 200      ( 16 Jul 2018 04:46 )             34.5    07-16    133<L>  |  94<L>  |  19  ----------------------------<  116<H>  4.3   |  29  |  1.00    Ca    9.3      16 Jul 2018 04:46          Patient is a 62y old  Male S/P Open reduction and internal fixation (ORIF) of fracture of tibial plateau     Plan:  - Pain Control PRN  - DVT ppx- Lovenox 40mg SQ Daily  - NWB LLE  - PT consult pending  - Venodynes/IS  - DASH/TLC Diet  - Check labs in am   - Continue current tx    Malinda Martins PA-C  Orthopedic Surgery  [id number]

## 2018-07-16 NOTE — PROGRESS NOTE ADULT - SUBJECTIVE AND OBJECTIVE BOX
Patient resting without complaints. Pain well controlled. Was able to sleep overnight.  No chest pain, SOB, N/V, palpitations.     Vital Signs Last 24 Hrs  T(C): 36.9 (16 Jul 2018 04:34), Max: 37 (16 Jul 2018 00:38)  T(F): 98.4 (16 Jul 2018 04:34), Max: 98.6 (16 Jul 2018 00:38)  HR: 84 (16 Jul 2018 04:34) (84 - 97)  BP: 133/81 (16 Jul 2018 04:34) (119/70 - 133/81)  BP(mean): --  RR: 18 (16 Jul 2018 04:34) (18 - 18)  SpO2: 97% (16 Jul 2018 04:34) (96% - 99%)      Exam:  Alert and Oriented, No Acute Distress  Pulm: CTAB  Lower Extremities:  LLE: Nonpitting edema, Ex-Fix in place, compartments soft, DP2+, toes warm and mobile with brisk cap refill, +DF/PF weak due to pain

## 2018-07-16 NOTE — PROGRESS NOTE ADULT - PROBLEM SELECTOR PLAN 1
Patient seen s/p ORIF with removal of exfix  continue ICE to decrease swelling  keep leg elevated  pain meds as needed  DVT and GI prophylaxis

## 2018-07-16 NOTE — BRIEF OPERATIVE NOTE - PROCEDURE
<<-----Click on this checkbox to enter Procedure Open reduction and internal fixation (ORIF) of fracture of tibial plateau  07/16/2018  left  Active  KYNGSTROM

## 2018-07-16 NOTE — PRE-OP CHECKLIST - 1.
LLE Ex fix.
Emotional support and pre-op teaching provided to patient
Emotional support and pre-op teaching provided to patient
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
## 2018-07-17 LAB
ANION GAP SERPL CALC-SCNC: 10 MMOL/L — SIGNIFICANT CHANGE UP (ref 5–17)
BUN SERPL-MCNC: 22 MG/DL — SIGNIFICANT CHANGE UP (ref 7–23)
CALCIUM SERPL-MCNC: 8.9 MG/DL — SIGNIFICANT CHANGE UP (ref 8.4–10.5)
CHLORIDE SERPL-SCNC: 95 MMOL/L — LOW (ref 96–108)
CO2 SERPL-SCNC: 30 MMOL/L — SIGNIFICANT CHANGE UP (ref 22–31)
CREAT SERPL-MCNC: 0.92 MG/DL — SIGNIFICANT CHANGE UP (ref 0.5–1.3)
GLUCOSE SERPL-MCNC: 132 MG/DL — HIGH (ref 70–99)
HCT VFR BLD CALC: 32.3 % — LOW (ref 39–50)
HGB BLD-MCNC: 11 G/DL — LOW (ref 13–17)
MCHC RBC-ENTMCNC: 31.1 PG — SIGNIFICANT CHANGE UP (ref 27–34)
MCHC RBC-ENTMCNC: 34.1 GM/DL — SIGNIFICANT CHANGE UP (ref 32–36)
MCV RBC AUTO: 91.2 FL — SIGNIFICANT CHANGE UP (ref 80–100)
PLATELET # BLD AUTO: 263 K/UL — SIGNIFICANT CHANGE UP (ref 150–400)
POTASSIUM SERPL-MCNC: 4.8 MMOL/L — SIGNIFICANT CHANGE UP (ref 3.5–5.3)
POTASSIUM SERPL-SCNC: 4.8 MMOL/L — SIGNIFICANT CHANGE UP (ref 3.5–5.3)
RBC # BLD: 3.54 M/UL — LOW (ref 4.2–5.8)
RBC # FLD: 13.1 % — SIGNIFICANT CHANGE UP (ref 10.3–14.5)
SODIUM SERPL-SCNC: 135 MMOL/L — SIGNIFICANT CHANGE UP (ref 135–145)
WBC # BLD: 7.87 K/UL — SIGNIFICANT CHANGE UP (ref 3.8–10.5)
WBC # FLD AUTO: 7.87 K/UL — SIGNIFICANT CHANGE UP (ref 3.8–10.5)

## 2018-07-17 RX ADMIN — OXYCODONE HYDROCHLORIDE 10 MILLIGRAM(S): 5 TABLET ORAL at 06:19

## 2018-07-17 RX ADMIN — Medication 100 MILLIGRAM(S): at 05:49

## 2018-07-17 RX ADMIN — OXYCODONE HYDROCHLORIDE 10 MILLIGRAM(S): 5 TABLET ORAL at 20:06

## 2018-07-17 RX ADMIN — Medication 100 MILLIGRAM(S): at 23:05

## 2018-07-17 RX ADMIN — Medication 100 MILLIGRAM(S): at 05:50

## 2018-07-17 RX ADMIN — ENOXAPARIN SODIUM 40 MILLIGRAM(S): 100 INJECTION SUBCUTANEOUS at 05:54

## 2018-07-17 RX ADMIN — OXYCODONE HYDROCHLORIDE 10 MILLIGRAM(S): 5 TABLET ORAL at 11:08

## 2018-07-17 RX ADMIN — OXYCODONE HYDROCHLORIDE 10 MILLIGRAM(S): 5 TABLET ORAL at 00:49

## 2018-07-17 RX ADMIN — OXYCODONE HYDROCHLORIDE 10 MILLIGRAM(S): 5 TABLET ORAL at 05:49

## 2018-07-17 RX ADMIN — Medication 1 TABLET(S): at 12:43

## 2018-07-17 RX ADMIN — OXYCODONE HYDROCHLORIDE 10 MILLIGRAM(S): 5 TABLET ORAL at 11:38

## 2018-07-17 RX ADMIN — Medication 100 MILLIGRAM(S): at 14:07

## 2018-07-17 RX ADMIN — MONTELUKAST 10 MILLIGRAM(S): 4 TABLET, CHEWABLE ORAL at 23:05

## 2018-07-17 RX ADMIN — OXYCODONE HYDROCHLORIDE 10 MILLIGRAM(S): 5 TABLET ORAL at 20:36

## 2018-07-17 RX ADMIN — SODIUM CHLORIDE 75 MILLILITER(S): 9 INJECTION INTRAMUSCULAR; INTRAVENOUS; SUBCUTANEOUS at 05:49

## 2018-07-17 RX ADMIN — MAGNESIUM HYDROXIDE 30 MILLILITER(S): 400 TABLET, CHEWABLE ORAL at 05:53

## 2018-07-17 RX ADMIN — OXYCODONE HYDROCHLORIDE 10 MILLIGRAM(S): 5 TABLET ORAL at 01:49

## 2018-07-17 NOTE — PROGRESS NOTE ADULT - ASSESSMENT
63 yo male with a left tibial plateau fxs/p ORIF after being placed on Exfix The patient has a left  tibial plateau fracture, immobilized with an external fixation device until July 16, 2018, when he was able to undergo a  successful surgical stabilization of the fracture.  He is now out of his external fixation and cleared to begin physical therapy.  He has been advised moderation and anticipate rehabilitation to follow.
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
## 2018-07-17 NOTE — PHYSICAL THERAPY INITIAL EVALUATION ADULT - TRANSFER TRAINING, PT EVAL
GOAL: Pt will perform ALL transfers with independence, w/use of appropriate assistive device as needed, in 2 weeks.

## 2018-07-17 NOTE — PHYSICAL THERAPY INITIAL EVALUATION ADULT - STRENGTHENING, PT EVAL
GOAL: Pt will improve L LE strength to 5/5, for increased limb stability, to improve gait and facilitate stair negotiation in 2 weeks.

## 2018-07-17 NOTE — PHYSICAL THERAPY INITIAL EVALUATION ADULT - ADDITIONAL COMMENTS
Pt resides in home with 5 steps to enter & no HR. There are 10 steps to get to second floor. Pt has a walk in shower. Prior to admission pt was independent of all ADL's.

## 2018-07-17 NOTE — PHYSICAL THERAPY INITIAL EVALUATION ADULT - ACTIVE RANGE OF MOTION EXAMINATION, REHAB EVAL
Right LE Active ROM was WFL (within functional limits)/bilateral upper extremity Active ROM was WFL (within functional limits)/LLE in knee immobilizer

## 2018-07-17 NOTE — PHYSICAL THERAPY INITIAL EVALUATION ADULT - PERTINENT HX OF CURRENT PROBLEM, REHAB EVAL
61 y/o M, c/o L knee pain. Pt s/p fall, w/ a L tibial plateau fx, POD#1 LLE ORIF after being placed on external fixator.

## 2018-07-17 NOTE — DIETITIAN INITIAL EVALUATION ADULT. - NS AS NUTRI INTERV ED CONTENT
Provided education on Heart-Healthy Nutrition Therapy including sources of lean protein, heart healthy fats, limiting high sodium foods and incorporating fruits, vegetables and whole grains in the diet./Purpose of the nutrition education

## 2018-07-17 NOTE — DIETITIAN INITIAL EVALUATION ADULT. - OTHER INFO
Patient seen for routine Length Of Stay on [facility]. Pt reports UBW as ~180-185 pounds, reports weight fluctuates slightly as pt states he is more physically active in the summer. Per previous RD note (8/2016) pt noted with weight of 185 pounds, indicating stable weight for the last 2 years. Current dosing weight is 184 pounds. Pt endorses good PO intake during admission, denies any nausea/vomiting. Pt does reports constipation, reports last BM x 3-4 days ago. Last BM noted on 7/12. Offered to add stewed prunes to tray but pt declines. Encouraged pt to consume high fiber foods and adequate fluids to help promote regularity in bowel movements.

## 2018-07-17 NOTE — PROGRESS NOTE ADULT - ATTENDING COMMENTS
The patient is medically stable, medically optimized and has no medical contraindication to surgery tomorrow as required. Exam time 40 minutes including > than 50 % for bedside discussion and counseling. Beginning to ambulate and doing well. No medical complications post-op to date and to proceed with physical therapy, as tolerated. Continues pulmonary toilet to lessen atelectasis, leg exercises as taught to lessen the risk of DVT and supervised pain medications for post-op pain control. I anticipate transfer to rehabilitation to follow when cleared by orthopedics.

## 2018-07-17 NOTE — DIETITIAN INITIAL EVALUATION ADULT. - ORAL INTAKE PTA
Pt reports with good PO intake PTA, denies any recent changes in appetite. Confirms NKFA. Denies micronutrient supplementation. Pt denies chewing/swallowing difficulty, nausea, vomiting, diarrhea, constipation PTA./good

## 2018-07-17 NOTE — PROGRESS NOTE ADULT - SUBJECTIVE AND OBJECTIVE BOX
patient seen and examined. pain well controlled. denies cp/sob.     ICU Vital Signs Last 24 Hrs  T(C): 36.4 (17 Jul 2018 05:48), Max: 36.9 (16 Jul 2018 09:40)  T(F): 97.5 (17 Jul 2018 05:48), Max: 98.5 (16 Jul 2018 20:00)  HR: 93 (17 Jul 2018 05:48) (83 - 368)  BP: 136/87 (17 Jul 2018 05:48) (113/60 - 143/75)  BP(mean): 103 (16 Jul 2018 17:45) (81 - 103)  ABP: --  ABP(mean): --  RR: 16 (17 Jul 2018 05:48) (14 - 20)  SpO2: 94% (17 Jul 2018 05:48) (94% - 100%)        Exam:   Alert and Oriented; No Acute Distress  Card: +S1/S2, RRR  Pulm: CTAB  Ext: LLE: ACE dressing C/D/I, Toes warm and mobile with dull sensation grossly intact to light touch, DP2+, +DF/PF, iced/elevated        Post op Xray: In chart                             Patient is a 62y old  Male S/P Open reduction and internal fixation (ORIF) of fracture of tibial plateau with removal of ex fix POD 1    Plan:  - Pain Control PRN  - DVT ppx- Lovenox 40mg SQ Daily  - NWB LLE  - PT consult pending  - Venodynes/IS/foot pumps  - DASH/TLC Diet  - fu labs  - Continue current tx  -ice/elevate  -dispo planning

## 2018-07-17 NOTE — DIETITIAN INITIAL EVALUATION ADULT. - ENERGY NEEDS
Ht: 68inches, Wt: 184lbs, BMI: 28kg/m2, IBW: 154lbs +/- 10%, %IBW: 119%  +1 left foot Edema, Skin: free of pressure injuries   Pertinent Information: This is a 62 year old with past medical history of colon cancer, sphyllis, hepatitis B presenting with closed fracture of left tibial plateau s/p ORIF. Discharge planning to rehab.

## 2018-07-18 ENCOUNTER — TRANSCRIPTION ENCOUNTER (OUTPATIENT)
Age: 63
End: 2018-07-18

## 2018-07-18 VITALS
TEMPERATURE: 99 F | SYSTOLIC BLOOD PRESSURE: 121 MMHG | HEART RATE: 87 BPM | DIASTOLIC BLOOD PRESSURE: 77 MMHG | OXYGEN SATURATION: 96 % | RESPIRATION RATE: 18 BRPM

## 2018-07-18 LAB
ANION GAP SERPL CALC-SCNC: 8 MMOL/L — SIGNIFICANT CHANGE UP (ref 5–17)
BUN SERPL-MCNC: 22 MG/DL — SIGNIFICANT CHANGE UP (ref 7–23)
CALCIUM SERPL-MCNC: 8.9 MG/DL — SIGNIFICANT CHANGE UP (ref 8.4–10.5)
CHLORIDE SERPL-SCNC: 95 MMOL/L — LOW (ref 96–108)
CO2 SERPL-SCNC: 31 MMOL/L — SIGNIFICANT CHANGE UP (ref 22–31)
CREAT SERPL-MCNC: 0.93 MG/DL — SIGNIFICANT CHANGE UP (ref 0.5–1.3)
GLUCOSE SERPL-MCNC: 113 MG/DL — HIGH (ref 70–99)
POTASSIUM SERPL-MCNC: 4.2 MMOL/L — SIGNIFICANT CHANGE UP (ref 3.5–5.3)
POTASSIUM SERPL-SCNC: 4.2 MMOL/L — SIGNIFICANT CHANGE UP (ref 3.5–5.3)
SODIUM SERPL-SCNC: 134 MMOL/L — LOW (ref 135–145)

## 2018-07-18 PROCEDURE — 73630 X-RAY EXAM OF FOOT: CPT

## 2018-07-18 PROCEDURE — 86850 RBC ANTIBODY SCREEN: CPT

## 2018-07-18 PROCEDURE — 86901 BLOOD TYPING SEROLOGIC RH(D): CPT

## 2018-07-18 PROCEDURE — 86900 BLOOD TYPING SEROLOGIC ABO: CPT

## 2018-07-18 PROCEDURE — 83605 ASSAY OF LACTIC ACID: CPT

## 2018-07-18 PROCEDURE — C1713: CPT

## 2018-07-18 PROCEDURE — C1889: CPT

## 2018-07-18 PROCEDURE — 73610 X-RAY EXAM OF ANKLE: CPT

## 2018-07-18 PROCEDURE — 73700 CT LOWER EXTREMITY W/O DYE: CPT

## 2018-07-18 PROCEDURE — 73590 X-RAY EXAM OF LOWER LEG: CPT

## 2018-07-18 PROCEDURE — 82330 ASSAY OF CALCIUM: CPT

## 2018-07-18 PROCEDURE — 82435 ASSAY OF BLOOD CHLORIDE: CPT

## 2018-07-18 PROCEDURE — 80053 COMPREHEN METABOLIC PANEL: CPT

## 2018-07-18 PROCEDURE — 85014 HEMATOCRIT: CPT

## 2018-07-18 PROCEDURE — 82803 BLOOD GASES ANY COMBINATION: CPT

## 2018-07-18 PROCEDURE — 84132 ASSAY OF SERUM POTASSIUM: CPT

## 2018-07-18 PROCEDURE — 85027 COMPLETE CBC AUTOMATED: CPT

## 2018-07-18 PROCEDURE — 81003 URINALYSIS AUTO W/O SCOPE: CPT

## 2018-07-18 PROCEDURE — 82947 ASSAY GLUCOSE BLOOD QUANT: CPT

## 2018-07-18 PROCEDURE — 73552 X-RAY EXAM OF FEMUR 2/>: CPT

## 2018-07-18 PROCEDURE — 80048 BASIC METABOLIC PNL TOTAL CA: CPT

## 2018-07-18 PROCEDURE — 82550 ASSAY OF CK (CPK): CPT

## 2018-07-18 PROCEDURE — 71045 X-RAY EXAM CHEST 1 VIEW: CPT

## 2018-07-18 PROCEDURE — 76377 3D RENDER W/INTRP POSTPROCES: CPT

## 2018-07-18 PROCEDURE — 73502 X-RAY EXAM HIP UNI 2-3 VIEWS: CPT

## 2018-07-18 PROCEDURE — 99285 EMERGENCY DEPT VISIT HI MDM: CPT

## 2018-07-18 PROCEDURE — 93970 EXTREMITY STUDY: CPT

## 2018-07-18 PROCEDURE — 97161 PT EVAL LOW COMPLEX 20 MIN: CPT

## 2018-07-18 PROCEDURE — 76000 FLUOROSCOPY <1 HR PHYS/QHP: CPT

## 2018-07-18 PROCEDURE — 85610 PROTHROMBIN TIME: CPT

## 2018-07-18 PROCEDURE — 73562 X-RAY EXAM OF KNEE 3: CPT

## 2018-07-18 PROCEDURE — 85730 THROMBOPLASTIN TIME PARTIAL: CPT

## 2018-07-18 PROCEDURE — 84295 ASSAY OF SERUM SODIUM: CPT

## 2018-07-18 PROCEDURE — C1769: CPT

## 2018-07-18 PROCEDURE — 93005 ELECTROCARDIOGRAM TRACING: CPT

## 2018-07-18 PROCEDURE — 97165 OT EVAL LOW COMPLEX 30 MIN: CPT

## 2018-07-18 RX ORDER — ENOXAPARIN SODIUM 100 MG/ML
40 INJECTION SUBCUTANEOUS
Qty: 0 | Refills: 0 | COMMUNITY
Start: 2018-07-18

## 2018-07-18 RX ORDER — DOCUSATE SODIUM 100 MG
1 CAPSULE ORAL
Qty: 0 | Refills: 0 | COMMUNITY
Start: 2018-07-18

## 2018-07-18 RX ORDER — ACETAMINOPHEN 500 MG
2 TABLET ORAL
Qty: 0 | Refills: 0 | COMMUNITY
Start: 2018-07-18

## 2018-07-18 RX ORDER — OXYCODONE HYDROCHLORIDE 5 MG/1
1 TABLET ORAL
Qty: 0 | Refills: 0 | COMMUNITY
Start: 2018-07-18

## 2018-07-18 RX ORDER — MAGNESIUM HYDROXIDE 400 MG/1
30 TABLET, CHEWABLE ORAL
Qty: 0 | Refills: 0 | COMMUNITY
Start: 2018-07-18

## 2018-07-18 RX ADMIN — OXYCODONE HYDROCHLORIDE 10 MILLIGRAM(S): 5 TABLET ORAL at 05:29

## 2018-07-18 RX ADMIN — Medication 1 TABLET(S): at 11:24

## 2018-07-18 RX ADMIN — OXYCODONE HYDROCHLORIDE 10 MILLIGRAM(S): 5 TABLET ORAL at 11:55

## 2018-07-18 RX ADMIN — OXYCODONE HYDROCHLORIDE 10 MILLIGRAM(S): 5 TABLET ORAL at 11:25

## 2018-07-18 RX ADMIN — OXYCODONE HYDROCHLORIDE 10 MILLIGRAM(S): 5 TABLET ORAL at 16:08

## 2018-07-18 RX ADMIN — Medication 100 MILLIGRAM(S): at 14:51

## 2018-07-18 RX ADMIN — Medication 100 MILLIGRAM(S): at 05:29

## 2018-07-18 RX ADMIN — OXYCODONE HYDROCHLORIDE 10 MILLIGRAM(S): 5 TABLET ORAL at 05:59

## 2018-07-18 RX ADMIN — ENOXAPARIN SODIUM 40 MILLIGRAM(S): 100 INJECTION SUBCUTANEOUS at 05:29

## 2018-07-18 RX ADMIN — OXYCODONE HYDROCHLORIDE 10 MILLIGRAM(S): 5 TABLET ORAL at 16:38

## 2018-07-18 NOTE — PROGRESS NOTE ADULT - PROBLEM SELECTOR PLAN 2
No new sequale  follow for any sign of rectal bleeding

## 2018-07-18 NOTE — DISCHARGE NOTE ADULT - HOSPITAL COURSE
This is a 63 y/o male admitted to [facility] for a left tibial plateau fracture.  Patient was cleared by Medicine for surgery.  Patient was placed in an external fixator until definitive surgery was possible.  Patient then underwent an uncomplicated open reduction, internal fixation.  Patient was then evaluated and treated by Physical Therapy, recommended for subacute rehab.  Patient will be discharged when rehab bed available.

## 2018-07-18 NOTE — PROGRESS NOTE ADULT - ASSESSMENT
The patient has a left  tibial plateau fracture, immobilized with an external fixation device until July 16, 2018, when he was able to undergo a  successful surgical stabilization of the fracture.  He is now out of his external fixation and cleared to begin physical therapy.  He has been advised moderation and anticipate rehabilitation to follow.

## 2018-07-18 NOTE — DISCHARGE NOTE ADULT - NS AS ACTIVITY OBS
Walking-Indoors allowed/Do not make important decisions/Stairs allowed/No Heavy lifting/straining/Showering allowed/stairs/shower with assistance/Walking-Outdoors allowed/Do not drive or operate machinery

## 2018-07-18 NOTE — DISCHARGE NOTE ADULT - CARE PROVIDER_API CALL
Burton Dawkins (MD), Orthopaedic Surgery  [address] [place] [address]  Phone: [phone]  Fax: [phone]

## 2018-07-18 NOTE — PROGRESS NOTE ADULT - PROBLEM SELECTOR PLAN 4
Treated  no new Issues
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
## 2018-07-18 NOTE — PROGRESS NOTE ADULT - PROVIDER SPECIALTY LIST ADULT
Internal Medicine
Orthopedics
Internal Medicine

## 2018-07-18 NOTE — DISCHARGE NOTE ADULT - CARE PLAN
Principal Discharge DX:	Closed fracture of left tibial plateau, initial encounter  Goal:	improved ADL's, pain control  Assessment and plan of treatment:	Please follow up with Dr. Dawkins after your discharge from Rehab.  PT-non-weight bearing left lower extremity.  Lovenox daily x 6 weeks total for dvt prevention.  Keep dressings clean and intact, have doctor remove staples post op day 14 and apply steristrips if applicable.

## 2018-07-18 NOTE — PROGRESS NOTE ADULT - SUBJECTIVE AND OBJECTIVE BOX
patient seen and examined. pain well controlled. denies cp/sob. states he was able to get OOB to chair yesterday and maneuver his leg around without issue.     Vital Signs Last 24 Hrs  T(C): 36.9 (18 Jul 2018 05:16), Max: 37.1 (17 Jul 2018 14:26)  T(F): 98.4 (18 Jul 2018 05:16), Max: 98.8 (17 Jul 2018 14:26)  HR: 85 (18 Jul 2018 05:16) (85 - 101)  BP: 119/68 (18 Jul 2018 05:16) (112/65 - 138/72)  BP(mean): --  RR: 18 (18 Jul 2018 05:16) (16 - 18)  SpO2: 98% (18 Jul 2018 05:16) (96% - 98%)    Exam:   Alert and Oriented; No Acute Distress  Card: +S1/S2, RRR  Pulm: CTAB  Ext: LLE: ACE dressing C/D/I, Toes warm and mobile with dull sensation grossly intact to light touch, DP2+, +DF/PF, iced/elevated, compartments soft                   Patient is a 62y old  Male S/P Open reduction and internal fixation (ORIF) of fracture of tibial plateau with removal of ex fix POD 2    Plan:  - Pain Control PRN  - DVT ppx- Lovenox 40mg SQ Daily  - NWB LLE  - PT   - Venodynes/IS/foot pumps  - DASH/TLC Diet  - fu labs  - Continue current tx  -ice/elevate  -dispo planning

## 2018-07-18 NOTE — PROGRESS NOTE ADULT - PROBLEM SELECTOR PROBLEM 2
Colon cancer without distant metastasis

## 2018-07-18 NOTE — DISCHARGE NOTE ADULT - MEDICATION SUMMARY - MEDICATIONS TO TAKE
I will START or STAY ON the medications listed below when I get home from the hospital:    acetaminophen 325 mg oral tablet  -- 2 tab(s) by mouth every 6 hours, As needed, For Temp greater than 38 C (100.4 F)  -- Indication: For temp    acetaminophen 325 mg oral tablet  -- 2 tab(s) by mouth every 6 hours, As needed, Mild Pain (1 - 3)  -- Indication: For pain    oxyCODONE 5 mg oral tablet  -- 1 tab(s) by mouth every 4 hours, As needed, Moderate Pain (4 - 6)  -- Indication: For pain    oxyCODONE 10 mg oral tablet  -- 1 tab(s) by mouth every 4 hours, As needed, Severe Pain (7 - 10)  -- Indication: For pain    magnesium hydroxide 8% oral suspension  -- 30 milliliter(s) by mouth once a day, As needed, Constipation  -- Indication: For Constipation    aluminum hydroxide-magnesium hydroxide 200 mg-200 mg/5 mL oral suspension  -- 30 milliliter(s) by mouth 4 times a day, As needed, Indigestion  -- Indication: For dyspepsia    enoxaparin  -- 40 milligram(s) subcutaneous once a day x 6 weeks total for dvt prevention  -- Indication: For dvt prevention    levETIRAcetam 500 mg oral tablet  -- 1 tab(s) by mouth 2 times a day  -- Indication: For Seizure prevention    Allegra Allergy 60 mg oral tablet  -- 1 tab(s) by mouth 2 times a day  -- Indication: For allergies    docusate sodium 100 mg oral capsule  -- 1 cap(s) by mouth 3 times a day  -- Indication: For Stool softener    Singulair 10 mg oral tablet  -- 1 tab(s) by mouth once a day (at bedtime)  -- Indication: For allergies    Multiple Vitamins oral tablet  -- 1 tab(s) by mouth once a day  -- Indication: For Supplement

## 2018-07-18 NOTE — DISCHARGE NOTE ADULT - ADDITIONAL INSTRUCTIONS
please follow up with Dr. Dawkins.  It is recommended you see your PCP within 1 month for routine follow up.

## 2018-07-18 NOTE — DISCHARGE NOTE ADULT - PATIENT PORTAL LINK FT
You can access the [facility] Patient Portal, offered by [facility], by registering with the following website: [email or web address]

## 2018-07-18 NOTE — DISCHARGE NOTE ADULT - REASON FOR ADMISSION
tibial plateau fracture-s/p external fixation, removal of exfix and Open reduction, internal fixation.

## 2018-07-18 NOTE — PROGRESS NOTE ADULT - PROBLEM SELECTOR PLAN 3
continue to monitor LFTs  further treatment as per hepatology as needed

## 2018-07-30 ENCOUNTER — TRANSCRIPTION ENCOUNTER (OUTPATIENT)
Age: 63
End: 2018-07-30

## 2018-08-07 ENCOUNTER — INBOUND DOCUMENT (OUTPATIENT)
Age: 63
End: 2018-08-07

## 2018-08-21 ENCOUNTER — APPOINTMENT (OUTPATIENT)
Dept: ORTHOPEDIC SURGERY | Facility: CLINIC | Age: 63
End: 2018-08-21
Payer: COMMERCIAL

## 2018-08-21 VITALS
WEIGHT: 185 LBS | BODY MASS INDEX: 28.04 KG/M2 | DIASTOLIC BLOOD PRESSURE: 76 MMHG | HEIGHT: 68 IN | HEART RATE: 92 BPM | SYSTOLIC BLOOD PRESSURE: 124 MMHG

## 2018-08-21 PROCEDURE — 99024 POSTOP FOLLOW-UP VISIT: CPT

## 2018-08-21 PROCEDURE — 73562 X-RAY EXAM OF KNEE 3: CPT | Mod: LT

## 2018-08-21 RX ORDER — CHROMIUM 200 MCG
TABLET ORAL
Refills: 0 | Status: ACTIVE | COMMUNITY

## 2018-08-24 ENCOUNTER — INBOUND DOCUMENT (OUTPATIENT)
Age: 63
End: 2018-08-24

## 2018-09-25 ENCOUNTER — APPOINTMENT (OUTPATIENT)
Dept: ORTHOPEDIC SURGERY | Facility: CLINIC | Age: 63
End: 2018-09-25
Payer: COMMERCIAL

## 2018-09-25 PROCEDURE — 99024 POSTOP FOLLOW-UP VISIT: CPT

## 2018-09-25 PROCEDURE — 73562 X-RAY EXAM OF KNEE 3: CPT | Mod: LT

## 2018-10-23 ENCOUNTER — APPOINTMENT (OUTPATIENT)
Dept: ORTHOPEDIC SURGERY | Facility: CLINIC | Age: 63
End: 2018-10-23
Payer: COMMERCIAL

## 2018-10-23 VITALS
HEART RATE: 77 BPM | BODY MASS INDEX: 28.04 KG/M2 | DIASTOLIC BLOOD PRESSURE: 78 MMHG | HEIGHT: 68 IN | WEIGHT: 185 LBS | SYSTOLIC BLOOD PRESSURE: 148 MMHG

## 2018-10-23 PROCEDURE — 99213 OFFICE O/P EST LOW 20 MIN: CPT

## 2018-10-23 PROCEDURE — 73562 X-RAY EXAM OF KNEE 3: CPT | Mod: LT

## 2018-11-13 NOTE — DISCHARGE NOTE ADULT - PLAN OF CARE
HISTORY AND PHYSICAL        Patient Identification:  Name:  Catarino Arvizu  Age:  63 y.o.  Sex:  male  :  1955  MRN:  [id number]   Visit Number:  [id number]  Primary Care Physician:  Eleno Chaney APRN       Subjective     Subjective     Chief complaint:     Chief Complaint   Patient presents with   • Chest Pain       History of presenting illness:     This is a 62 y/o male patient with history of chest pain,shortness of breath, CKD, Sleep apnea, CPAP dependent, DM, GERD, hypertension heart arrhythmia, Polio, and history of prostate cancer. He presented to the ED with substernal chest pain and worsening shortness of breath. Apparently he had a stress test 2018 reported as a low risk study. He states that his chest pain is worse with ambulation. He is sitting up in bedside chair and denies chest pain or shortness of breath at this time. 02 NC 2L in use. He is an established patient of Dr. Conroy.      ---------------------------------------------------------------------------------------------------------------------     Review Of Systems:    Constitutional: no fever, chills and night sweats. No appetite change or unexpected weight change. No fatigue.  Eyes: no eye drainage, itching or redness.  HEENT: no mouth sores, dysphagia or nose bleed.  Respiratory: no for shortness of breath, cough or production of sputum.  Cardiovascular: no chest pain, no palpitations, no orthopnea.  Gastrointestinal: no nausea, vomiting or diarrhea. No abdominal pain, hematemesis or rectal bleeding.  Genitourinary: no dysuria or polyuria.  Hematologic/lymphatic: no lymph node abnormalities, no easy bruising or easy bleeding.  Musculoskeletal: no muscle or joint pain.  Skin: No rash and no itching.  Neurological: no loss of consciousness, no seizure, no headache.  Behavioral/Psych: no depression or suicidal ideation.  Endocrine: no hot flashes.  Immunologic:  negative.    ---------------------------------------------------------------------------------------------------------------------     Past Medical History    Past Medical History:   Diagnosis Date   • Chest pain    • Chronic kidney disease    • CPAP (continuous positive airway pressure) dependence    • Diabetes mellitus (CMS/[facility])    • Elevated cholesterol    • GERD (gastroesophageal reflux disease)    • Heart murmur    • Hyperlipidemia    • Hypertension    • Irregular heart beat    • Polio    • Prostate cancer (CMS/[facility]) 05/2016    Dr. Khalif Kohler MD- [place], [place]   • Shortness of breath    • Sleep apnea        Past Surgical History    Past Surgical History:   Procedure Laterality Date   • CARDIAC CATHETERIZATION  2008    50% diffuse LAD stenosis, 50% septal  branch   • COLONOSCOPY      Long time ago   • HEMORRHOIDECTOMY     • HERNIA REPAIR     • UPPER GASTROINTESTINAL ENDOSCOPY      Long time ago       Family History    Family History   Problem Relation Age of Onset   • Heart disease Brother    • Diabetes Brother    • Cancer Brother         not sure the kind   • Heart disease Brother    • Diabetes Brother    • Diabetes Sister    • Diabetes Daughter    • Heart disease Daughter    • Pancreatitis Daughter    • Crohn's disease Daughter    • Diabetes Son    • Heart disease Son        Social History    Social History     Tobacco Use   • Smoking status: Never Smoker   • Smokeless tobacco: Current User     Types: Chew   • Tobacco comment: Chewed tobacco since he was 4 yrs old.   Substance Use Topics   • Alcohol use: No   • Drug use: No       Allergies    Patient has no known allergies.  ---------------------------------------------------------------------------------------------------------------------     Home Medications:    Prior to Admission Medications     Prescriptions Last Dose Informant Patient Reported? Taking?    amLODIPine (NORVASC) 10 MG tablet  Pharmacy Yes No    Take 10 mg by mouth daily.     ASPIRIN LOW DOSE 81 MG EC tablet   No No    TAKE 1 TABLET EVERY DAY    atorvastatin (LIPITOR) 80 MG tablet  Pharmacy Yes No    Take 80 mg by mouth daily.    carvedilol (COREG) 25 MG tablet   No No    Take 1 tablet by mouth 2 (Two) Times a Day.    cephalexin (KEFLEX) 500 MG capsule   Yes No    Take 500 mg by mouth 2 (Two) Times a Day.    cetirizine (ZyrTEC) 10 MG tablet  Pharmacy Yes No    Take 10 mg by mouth daily.    cyclobenzaprine (FLEXERIL) 10 MG tablet   Yes No    Take 10 mg by mouth 3 (Three) Times a Day As Needed for Muscle Spasms.    fenofibrate (TRICOR) 145 MG tablet   Yes No    Take 145 mg by mouth Daily.    Ferrous Sulfate (IRON) 325 (65 Fe) MG tablet   No No    TAKE 1 TABLET WITH BREAKFAST    glimepiride (AMARYL) 4 MG tablet  Pharmacy Yes No    Take 4 mg by mouth 2 (two) times a day.    HYDROcodone-acetaminophen (NORCO) 7.5-325 MG per tablet  Pharmacy Yes No    Take 1 tablet by mouth 2 (two) times a day as needed for moderate pain (4-6).    hydrOXYzine (VISTARIL) 25 MG capsule   Yes No    Take 25 mg by mouth 3 (Three) Times a Day As Needed for Itching.    Insulin Glargine (BASAGLAR KWIKPEN) 100 UNIT/ML injection pen   Yes No    Inject  under the skin.    isosorbide mononitrate (IMDUR) 120 MG 24 hr tablet   No No    Take 1 tablet by mouth Daily.    nitroglycerin (NITROSTAT) 0.4 MG SL tablet   Yes No    Place 0.4 mg under the tongue every 5 (five) minutes as needed for chest pain. Take no more than 3 doses in 15 minutes.    pantoprazole (PROTONIX) 40 MG EC tablet   Yes No    Take 1 tablet by mouth 2 (Two) Times a Day With Meals.    raNITIdine (ZANTAC) 300 MG tablet   Yes No    Take 300 mg by mouth Every Night.    sennosides-docusate sodium (SENOKOT-S) 8.6-50 MG tablet  Pharmacy Yes No    Take 2 tablets by mouth 2 (two) times a day.    sitaGLIPtin (JANUVIA) 50 MG tablet   No No    Take 1 tablet by mouth daily.    triamcinolone (KENALOG) 0.1 % ointment   Yes No    Apply  topically 2 (Two) Times a Day.         ---------------------------------------------------------------------------------------------------------------------    Objective     Objective     Hospital Scheduled Meds:    enoxaparin 30 mg Subcutaneous Q24H   insulin aspart 0-7 Units Subcutaneous 4x Daily AC & at Bedtime   sodium chloride 3 mL Intravenous Q12H   sodium chloride 3 mL Intravenous Q12H        ---------------------------------------------------------------------------------------------------------------------   Vital Signs:  Temp:  [96.6 °F (35.9 °C)-98.8 °F (37.1 °C)] 96.6 °F (35.9 °C)  Heart Rate:  [62-69] 64  Resp:  [18-24] 20  BP: (139-180)/(62-90) 162/80  Mean Arterial Pressure (Non-Invasive) for the past 24 hrs (Last 3 readings):   Noninvasive MAP (mmHg)   11/13/18 0734 131   11/13/18 0412 93   11/13/18 0000 94     SpO2 Percentage    11/13/18 0000 11/13/18 0412 11/13/18 0734   SpO2: 98% 97% 99%     SpO2:  [94 %-100 %] 99 %  on  Flow (L/min):  [2] 2;   Device (Oxygen Therapy): nasal cannula    Body mass index is 40.28 kg/m².  Wt Readings from Last 3 Encounters:   11/13/18 99.9 kg (220 lb 4 oz)   11/06/18 104 kg (230 lb)   07/16/18 94.8 kg (209 lb)     ---------------------------------------------------------------------------------------------------------------------     Physical Exam:    Constitutional:  Well-developed and well-nourished.  No respiratory distress.      HENT:  Head: Normocephalic and atraumatic.  Mouth:  Moist mucous membranes.    Eyes:  Conjunctivae and EOM are normal.  No scleral icterus.  Neck:  Neck supple.  No JVD present.    Cardiovascular:  Normal rate, regular rhythm and normal heart sounds with no murmur. No edema.  Pulmonary/Chest:  No respiratory distress, no wheezes, no crackles, with normal breath sounds and good air movement.  Abdominal:  Soft.  Bowel sounds are normal.  Distended.  Musculoskeletal:  No edema, no tenderness, and no deformity.  No swelling or redness of joints.  Neurological:  Alert and  oriented to person, place, and time.  No facial droop.  No slurred speech.   Skin:  Large growth/mole on left cheek, has been there for a long time per patient.  Psychiatric:  Normal mood and affect.  Behavior is normal.    ---------------------------------------------------------------------------------------------------------------------  I have personally reviewed the EKG/Telemetry strip  ---------------------------------------------------------------------------------------------------------------------   Results from last 7 days   Lab Units  11/13/18 0326 11/12/18   2240  11/12/18   1810   TROPONIN I ng/mL  0.008  0.018  0.019           Results from last 7 days   Lab Units  11/13/18 0326 11/12/18   1614   WBC 10*3/mm3  5.33  5.03   HEMOGLOBIN g/dL  7.3*  7.8*   HEMATOCRIT %  24.9*  25.3*   MCV fL  96.9*  91.0   MCHC g/dL  29.3*  30.8*   PLATELETS 10*3/mm3  187  194     Results from last 7 days   Lab Units  11/13/18 0326 11/12/18   1614   SODIUM mmol/L  142  139   POTASSIUM mmol/L  5.0  5.1   CHLORIDE mmol/L  113*  112   CO2 mmol/L  18.3*  20.1*   BUN mg/dL  31*  34*   CREATININE mg/dL  2.97*  3.10*   EGFR IF NONAFRICN AM mL/min/1.73  21*  20*   CALCIUM mg/dL  8.3  8.5   GLUCOSE mg/dL  155*  210*   ALBUMIN g/dL   --   4.20   BILIRUBIN mg/dL   --   0.3   ALK PHOS U/L   --   71   AST (SGOT) U/L   --   31   ALT (SGPT) U/L   --   29   Estimated Creatinine Clearance: 25.8 mL/min (A) (by C-G formula based on SCr of 2.97 mg/dL (H)).  No results found for: AMMONIA    Hemoglobin A1C   Date/Time Value Ref Range Status   11/13/2018 0326 8.00 (H) 4.50 - 5.70 % Final     Glucose   Date/Time Value Ref Range Status   11/13/2018 0629 149 (H) 70 - 130 mg/dL Final     Lab Results   Component Value Date    HGBA1C 8.00 (H) 11/13/2018     Lab Results   Component Value Date    TSH 1.835 07/11/2018    FREET4 1.03 08/12/2016          Urine Culture   Date Value Ref Range Status   11/06/2018 No growth  Final              Pain  Management Panel     Pain Management Panel Latest Ref Rng & Units 4/6/2017 4/6/2017    CREATININE UR mg/dL 83.0 83.0        I have personally reviewed the above laboratory results.   ---------------------------------------------------------------------------------------------------------------------  Imaging Results (last 7 days)     Procedure Component Value Units Date/Time    XR Chest 1 View [[id number]] Collected:  11/12/18 1654     Updated:  11/12/18 1711    Narrative:       XR CHEST 1 VW-     CLINICAL INDICATION: Chest Pain triage protocol          COMPARISON: 4/14/2013      TECHNIQUE: Single frontal view of the chest.     FINDINGS:     There is no focal alveolar infiltrate or effusion.  The cardiac silhouette is normal. The pulmonary vasculature is  unremarkable.  There is no evidence of an acute osseous abnormality.   There are no suspicious-appearing parenchymal soft tissue nodules.            Impression:       No evidence of active or acute cardiopulmonary disease on today's chest  radiograph.         This report was finalized on 11/12/2018 4:54 PM by Dr. Marcelino Bruner MD.           I have personally reviewed the above radiology results.   ---------------------------------------------------------------------------------------------------------------------      Assessment & Plan        Assessment/Plan       ASSESSMENT:    1. Chest pain      PLAN:    This is a 62 y/o male patient with history of chest pain,shortness of breath, CKD, Sleep apnea, CPAP dependent, DM, GERD, hypertension heart arrhythmia, Polio, and history of prostate cancer. He presented to the ED with substernal chest pain and worsening shortness of breath. Apparently he had a stress test March 2018 reported as a low risk study. He states that his chest pain is worse with ambulation. He is sitting up in bedside chair and denies chest pain or shortness of breath at this time. 02 NC 2L in use. He is an established patient of Dr. Conroy.    Troponin  0.008, BUN 31 Creat 2.97. Normal white normal. NO fever or diarrhea.     Telemetry monitoring, electrolyte protocol, VTE prophylaxis.     Cardiology consult.     Renal team consulted.    Patient's findings and recommendations were discussed with patient and nursing staff      Code Status:   Code Status and Medical Interventions:   Ordered at: 11/12/18 2023     Code Status:    CPR     Medical Interventions (Level of Support Prior to Arrest):    Full           Ada Smith, MISHEL  11/13/18  9:30 AM     Physician Attestation:    I have personally seen and examined the patient. I reviewed the patient's data including history of present illness, review of systems, physical examination, assessment and treatment plan and agree with findings above. The assessment and plan are my own.  I have reviewed and edited the note above after discussing the findings with the midlevel.    Mustapha Torres MD  Infectious Diseases  11/13/18  12:24 PM     improved ADL's, pain control Please follow up with Dr. Dawkins after your discharge from Rehab.  PT-non-weight bearing left lower extremity.  Lovenox daily x 6 weeks total for dvt prevention.  Keep dressings clean and intact, have doctor remove staples post op day 14 and apply steristrips if applicable.

## 2018-12-10 ENCOUNTER — APPOINTMENT (OUTPATIENT)
Dept: INFECTIOUS DISEASE | Facility: CLINIC | Age: 63
End: 2018-12-10
Payer: COMMERCIAL

## 2018-12-10 VITALS
HEART RATE: 79 BPM | DIASTOLIC BLOOD PRESSURE: 92 MMHG | SYSTOLIC BLOOD PRESSURE: 158 MMHG | OXYGEN SATURATION: 100 % | TEMPERATURE: 97 F | HEIGHT: 68 IN | BODY MASS INDEX: 29.1 KG/M2 | WEIGHT: 192 LBS

## 2018-12-10 PROCEDURE — 99213 OFFICE O/P EST LOW 20 MIN: CPT

## 2018-12-13 LAB — RPR SER-TITR: ABNORMAL

## 2018-12-19 ENCOUNTER — APPOINTMENT (OUTPATIENT)
Dept: ORTHOPEDIC SURGERY | Facility: CLINIC | Age: 63
End: 2018-12-19
Payer: COMMERCIAL

## 2018-12-19 PROCEDURE — 73562 X-RAY EXAM OF KNEE 3: CPT | Mod: LT

## 2018-12-19 PROCEDURE — 99213 OFFICE O/P EST LOW 20 MIN: CPT

## 2019-01-08 ENCOUNTER — NON-APPOINTMENT (OUTPATIENT)
Age: 64
End: 2019-01-08

## 2019-01-08 ENCOUNTER — APPOINTMENT (OUTPATIENT)
Dept: CARDIOLOGY | Facility: CLINIC | Age: 64
End: 2019-01-08
Payer: COMMERCIAL

## 2019-01-08 VITALS
HEIGHT: 68 IN | WEIGHT: 188 LBS | BODY MASS INDEX: 28.49 KG/M2 | OXYGEN SATURATION: 100 % | DIASTOLIC BLOOD PRESSURE: 76 MMHG | HEART RATE: 79 BPM | SYSTOLIC BLOOD PRESSURE: 135 MMHG

## 2019-01-08 DIAGNOSIS — Z78.9 OTHER SPECIFIED HEALTH STATUS: ICD-10-CM

## 2019-01-08 DIAGNOSIS — Z85.9 PERSONAL HISTORY OF MALIGNANT NEOPLASM, UNSPECIFIED: ICD-10-CM

## 2019-01-08 PROCEDURE — 93000 ELECTROCARDIOGRAM COMPLETE: CPT

## 2019-01-08 PROCEDURE — 99214 OFFICE O/P EST MOD 30 MIN: CPT

## 2019-01-08 RX ORDER — TRAMADOL HYDROCHLORIDE 50 MG/1
50 TABLET, COATED ORAL 4 TIMES DAILY
Qty: 60 | Refills: 0 | Status: DISCONTINUED | COMMUNITY
Start: 2018-08-21 | End: 2019-01-08

## 2019-01-08 NOTE — REVIEW OF SYSTEMS
[Negative] : Heme/Lymph [Shortness Of Breath] : no shortness of breath [Chest Pain] : no chest pain [Lower Ext Edema] : no extremity edema [Palpitations] : no palpitations

## 2019-01-08 NOTE — DISCUSSION/SUMMARY
[FreeTextEntry1] : The patient was examined. His  blood pressure was 135/76  His pulse was 79. His lungs were clear to auscultation. Cardiac exam was  negative for murmurs rubs or gallops.His EKG showed normal sinus rhythm, and poor R wave progression. No acute changes were seen.. The patient was advised to stay on his current medication. . He will return in 12 months, or earlier if needed. \par
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
## 2019-01-09 LAB
25(OH)D3 SERPL-MCNC: 33.8 NG/ML
ALBUMIN SERPL ELPH-MCNC: 4.5 G/DL
ALP BLD-CCNC: 94 U/L
ALT SERPL-CCNC: 15 U/L
ANION GAP SERPL CALC-SCNC: 18 MMOL/L
AST SERPL-CCNC: 21 U/L
BASOPHILS # BLD AUTO: 0.01 K/UL
BASOPHILS NFR BLD AUTO: 0.2 %
BILIRUB SERPL-MCNC: 0.3 MG/DL
BUN SERPL-MCNC: 18 MG/DL
CALCIUM SERPL-MCNC: 9.8 MG/DL
CHLORIDE SERPL-SCNC: 101 MMOL/L
CHOLEST SERPL-MCNC: 211 MG/DL
CHOLEST/HDLC SERPL: 3.1 RATIO
CO2 SERPL-SCNC: 21 MMOL/L
CREAT SERPL-MCNC: 1.07 MG/DL
EOSINOPHIL # BLD AUTO: 0.08 K/UL
EOSINOPHIL NFR BLD AUTO: 1.6 %
ESTIMATED AVERAGE GLUCOSE: 103 MG/DL
GLUCOSE SERPL-MCNC: 78 MG/DL
HBA1C MFR BLD HPLC: 5.2 %
HCT VFR BLD CALC: 43.9 %
HDLC SERPL-MCNC: 68 MG/DL
HGB BLD-MCNC: 14.3 G/DL
IMM GRANULOCYTES NFR BLD AUTO: 0.2 %
LDLC SERPL CALC-MCNC: 112 MG/DL
LYMPHOCYTES # BLD AUTO: 0.82 K/UL
LYMPHOCYTES NFR BLD AUTO: 16.6 %
MAN DIFF?: NORMAL
MCHC RBC-ENTMCNC: 30.1 PG
MCHC RBC-ENTMCNC: 32.6 GM/DL
MCV RBC AUTO: 92.4 FL
MONOCYTES # BLD AUTO: 0.6 K/UL
MONOCYTES NFR BLD AUTO: 12.1 %
NEUTROPHILS # BLD AUTO: 3.42 K/UL
NEUTROPHILS NFR BLD AUTO: 69.3 %
PLATELET # BLD AUTO: 175 K/UL
POTASSIUM SERPL-SCNC: 5 MMOL/L
PROT SERPL-MCNC: 6.9 G/DL
PSA FREE FLD-MCNC: 20 %
PSA FREE SERPL-MCNC: 0.15 NG/ML
PSA SERPL-MCNC: 0.76 NG/ML
RBC # BLD: 4.75 M/UL
RBC # FLD: 13.5 %
SODIUM SERPL-SCNC: 140 MMOL/L
T4 SERPL-MCNC: 6.7 UG/DL
TRIGL SERPL-MCNC: 157 MG/DL
TSH SERPL-ACNC: 2.13 UIU/ML
WBC # FLD AUTO: 4.94 K/UL

## 2019-04-16 ENCOUNTER — TRANSCRIPTION ENCOUNTER (OUTPATIENT)
Age: 64
End: 2019-04-16

## 2019-06-14 ENCOUNTER — APPOINTMENT (OUTPATIENT)
Dept: INFECTIOUS DISEASE | Facility: CLINIC | Age: 64
End: 2019-06-14
Payer: COMMERCIAL

## 2019-06-14 VITALS
TEMPERATURE: 97.2 F | OXYGEN SATURATION: 100 % | SYSTOLIC BLOOD PRESSURE: 130 MMHG | HEART RATE: 66 BPM | BODY MASS INDEX: 28.34 KG/M2 | WEIGHT: 187 LBS | DIASTOLIC BLOOD PRESSURE: 80 MMHG | HEIGHT: 68 IN

## 2019-06-14 PROCEDURE — 99213 OFFICE O/P EST LOW 20 MIN: CPT

## 2019-06-14 NOTE — DATA REVIEWED
[FreeTextEntry1] : \par \par 8/9/16\par RPR: 1:32\par T Pallidum ab positive\par \par CSF: 8/19/16\par WBC 12\par lymphs 84%\par \par VDRL 1:8\par HSV 1/2 PCR not detected

## 2019-06-14 NOTE — REVIEW OF SYSTEMS
[Limb Swelling] : limb swelling [As Noted in HPI] : as noted in HPI [Negative] : Heme/Lymph [Fever] : no fever [Body Aches] : no body aches [Chills] : no chills [Difficulty Sleeping] : no difficulty sleeping [FreeTextEntry9] : left leg [Feeling Tired] : not feeling tired [Feeling Sick] : not feeling sick [de-identified] : tremor right hand, tingling in left toes

## 2019-06-14 NOTE — PHYSICAL EXAM
[General Appearance - Alert] : alert [General Appearance - In No Acute Distress] : in no acute distress [General Appearance - Well-Appearing] : healthy appearing [Sclera] : the sclera and conjunctiva were normal [Outer Ear] : the ears and nose were normal in appearance [PERRL With Normal Accommodation] : pupils were equal in size, round, reactive to light [Oropharynx] : the oropharynx was normal with no thrush [Neck Appearance] : the appearance of the neck was normal [Heart Sounds] : normal S1 and S2 [Heart Rate And Rhythm] : heart rate was normal and rhythm regular [Auscultation Breath Sounds / Voice Sounds] : lungs were clear to auscultation bilaterally [Heart Sounds Gallop] : no gallops [Heart Sounds Pericardial Friction Rub] : no pericardial rub [Murmurs] : no murmurs [Edema] : there was no peripheral edema [Bowel Sounds] : normal bowel sounds [Abdomen Soft] : soft [Skin Color & Pigmentation] : normal skin color and pigmentation [Abdomen Tenderness] : non-tender [] : no rash [Oriented To Time, Place, And Person] : oriented to person, place, and time [FreeTextEntry1] : Left leg swelling noted
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
## 2019-06-14 NOTE — ASSESSMENT
[FreeTextEntry1] : This is a 64 yo M, prior colon cancer who presents for f/up of neurosyphilis today, s/p treatment 8/2016.\par \par Pt is s/p treatment with IV penicillin x 10 days.  Course complicated by status epilepticus and he was placed on keppra. Keppra now discontinued.  \par \par Overall pt seems much improved.  CSF studies from 3/2017 had resolution of lymphocytic pleocytosis.  VDRL remained the same at 1:8.\par \par Repeated RPR has been 1:4.  Repeat today along with other STD testing. \par \par Will call pt with results and pt to return in 6 months for repeat RPR testing.

## 2019-06-17 LAB
C TRACH RRNA SPEC QL NAA+PROBE: NOT DETECTED
HBV CORE IGG+IGM SER QL: REACTIVE
HBV DNA # SERPL NAA+PROBE: NOT DETECTED
HBV SURFACE AB SER QL: NONREACTIVE
HBV SURFACE AG SER QL: NONREACTIVE
HCV AB SER QL: NONREACTIVE
HCV S/CO RATIO: 0.05 S/CO
HEPB DNA PCR LOG: NOT DETECTED LOGIU/ML
HIV1+2 AB SPEC QL IA.RAPID: NONREACTIVE
N GONORRHOEA RRNA SPEC QL NAA+PROBE: NOT DETECTED
RPR SER-TITR: ABNORMAL
SOURCE AMPLIFICATION: NORMAL
SOURCE AMPLIFICATION: NORMAL
T VAGINALIS RRNA SPEC QL NAA+PROBE: NOT DETECTED

## 2019-06-19 ENCOUNTER — APPOINTMENT (OUTPATIENT)
Dept: ORTHOPEDIC SURGERY | Facility: CLINIC | Age: 64
End: 2019-06-19
Payer: COMMERCIAL

## 2019-06-19 DIAGNOSIS — S82.142D DISPLACED BICONDYLAR FRACTURE OF LEFT TIBIA, SUBSEQUENT ENCOUNTER FOR CLOSED FRACTURE WITH ROUTINE HEALING: ICD-10-CM

## 2019-06-19 PROCEDURE — 73562 X-RAY EXAM OF KNEE 3: CPT | Mod: LT

## 2019-06-19 PROCEDURE — 99213 OFFICE O/P EST LOW 20 MIN: CPT

## 2019-06-24 PROBLEM — S82.142D CLOSED FRACTURE OF LEFT TIBIAL PLATEAU WITH ROUTINE HEALING, SUBSEQUENT ENCOUNTER: Status: ACTIVE | Noted: 2018-08-21

## 2019-09-09 NOTE — BRIEF OPERATIVE NOTE - PRE-OP
<<-----Click on this checkbox to enter Pre-Op Dx
<<-----Click on this checkbox to enter Pre-Op Dx
Consult...

## 2019-12-10 ENCOUNTER — APPOINTMENT (OUTPATIENT)
Dept: INFECTIOUS DISEASE | Facility: CLINIC | Age: 64
End: 2019-12-10
Payer: MEDICARE

## 2019-12-10 VITALS
BODY MASS INDEX: 29.55 KG/M2 | WEIGHT: 195 LBS | DIASTOLIC BLOOD PRESSURE: 91 MMHG | SYSTOLIC BLOOD PRESSURE: 141 MMHG | TEMPERATURE: 98 F | HEIGHT: 68 IN | HEART RATE: 86 BPM | OXYGEN SATURATION: 99 %

## 2019-12-10 PROCEDURE — 99213 OFFICE O/P EST LOW 20 MIN: CPT

## 2019-12-10 NOTE — PHYSICAL EXAM
[General Appearance - Alert] : alert [General Appearance - In No Acute Distress] : in no acute distress [General Appearance - Well-Appearing] : healthy appearing [Sclera] : the sclera and conjunctiva were normal [Outer Ear] : the ears and nose were normal in appearance [PERRL With Normal Accommodation] : pupils were equal in size, round, reactive to light [Oropharynx] : the oropharynx was normal with no thrush [Neck Appearance] : the appearance of the neck was normal [Heart Rate And Rhythm] : heart rate was normal and rhythm regular [Auscultation Breath Sounds / Voice Sounds] : lungs were clear to auscultation bilaterally [Heart Sounds] : normal S1 and S2 [Heart Sounds Gallop] : no gallops [Murmurs] : no murmurs [Bowel Sounds] : normal bowel sounds [Heart Sounds Pericardial Friction Rub] : no pericardial rub [Edema] : there was no peripheral edema [Abdomen Soft] : soft [Abdomen Tenderness] : non-tender [] : no rash [Skin Color & Pigmentation] : normal skin color and pigmentation [Oriented To Time, Place, And Person] : oriented to person, place, and time [FreeTextEntry1] : Left leg swelling noted
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
## 2019-12-10 NOTE — REVIEW OF SYSTEMS
[Limb Swelling] : limb swelling [As Noted in HPI] : as noted in HPI [Negative] : Heme/Lymph [Chills] : no chills [Fever] : no fever [Body Aches] : no body aches [Feeling Sick] : not feeling sick [Difficulty Sleeping] : no difficulty sleeping [Feeling Tired] : not feeling tired [FreeTextEntry9] : left leg

## 2019-12-10 NOTE — ASSESSMENT
[FreeTextEntry1] : This is a 63 yo M, prior colon cancer who presents for f/up of neurosyphilis today, s/p treatment 8/2016.\par \par Pt is s/p treatment with IV penicillin x 10 days.  Course complicated by status epilepticus and he was placed on keppra. Keppra now discontinued.  \par \par Overall pt seems much improved.  CSF studies from 3/2017 had resolution of lymphocytic pleocytosis.  VDRL remained the same at 1:8.\par \par Repeated RPR has been stable at 1:4. Suspect he is now serofast and this is his baseline.   Repeat today and defers other STD testing. If RPR rises by two-fold, will assume he has new infection and will treat again for syphilis. \par \par Will call pt with results and pt to return in 12 months for repeat RPR testing.  Pt aware to return sooner if needed.

## 2019-12-13 LAB — RPR SER-TITR: ABNORMAL

## 2020-01-07 ENCOUNTER — NON-APPOINTMENT (OUTPATIENT)
Age: 65
End: 2020-01-07

## 2020-01-07 ENCOUNTER — APPOINTMENT (OUTPATIENT)
Dept: CARDIOLOGY | Facility: CLINIC | Age: 65
End: 2020-01-07
Payer: MEDICARE

## 2020-01-07 VITALS
SYSTOLIC BLOOD PRESSURE: 151 MMHG | DIASTOLIC BLOOD PRESSURE: 88 MMHG | HEART RATE: 82 BPM | BODY MASS INDEX: 29.35 KG/M2 | WEIGHT: 193 LBS | OXYGEN SATURATION: 98 %

## 2020-01-07 DIAGNOSIS — A53.0 LATENT SYPHILIS, UNSPECIFIED AS EARLY OR LATE: ICD-10-CM

## 2020-01-07 DIAGNOSIS — K64.8 OTHER HEMORRHOIDS: ICD-10-CM

## 2020-01-07 DIAGNOSIS — Z00.00 ENCOUNTER FOR GENERAL ADULT MEDICAL EXAMINATION W/OUT ABNORMAL FINDINGS: ICD-10-CM

## 2020-01-07 DIAGNOSIS — I10 ESSENTIAL (PRIMARY) HYPERTENSION: ICD-10-CM
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
## 2020-01-07 DIAGNOSIS — A52.3 NEUROSYPHILIS, UNSPECIFIED: ICD-10-CM

## 2020-01-07 DIAGNOSIS — G40.909 EPILEPSY, UNSPECIFIED, NOT INTRACTABLE, W/OUT STATUS EPILEPTICUS: ICD-10-CM

## 2020-01-07 DIAGNOSIS — E78.5 HYPERLIPIDEMIA, UNSPECIFIED: ICD-10-CM

## 2020-01-07 PROCEDURE — 93000 ELECTROCARDIOGRAM COMPLETE: CPT

## 2020-01-07 PROCEDURE — 99214 OFFICE O/P EST MOD 30 MIN: CPT

## 2020-01-07 NOTE — DISCUSSION/SUMMARY
[FreeTextEntry1] : The patient was examined. His  blood pressure was 151/88  His pulse was 82. His lungs were clear to auscultation. Cardiac exam was  negative for murmurs rubs or gallops.His EKG showed normal sinus rhythm, and a left anterior hemiblock.  No acute changes were seen.. The patient was advised to stay on his current medication. . He will return in 12 months, or earlier if needed.  Because of his borderline hypertension, he was advised to cut back on salt and caffeine.\par

## 2020-01-07 NOTE — REASON FOR VISIT
[FreeTextEntry1] : The patient comes in for followup of his hypertension, hyperlipidemia, neurosyphilis, and seizure disorder. He has no new complaints. Has not had a seizure again. He is off his Keppra. He denies any chest pains, shortness of breath, or palpitations.This past year, he slipped getting onto his boat and fractured his left tibial plateau. He is admitted to the hospital and they operated on him. He is now walking without any assistance. His syphilis titer is down to 1:4 .\par January 7, 2020: The patient feels well.  He denies any chest pains, shortness of breath, or palpitations.  He has been to his infectious disease doctor and his syphilis blood test titers are stable at 1:4.\par

## 2020-01-07 NOTE — PHYSICAL EXAM
[General Appearance - Well Developed] : well developed [Normal Appearance] : normal appearance [Well Groomed] : well groomed [General Appearance - Well Nourished] : well nourished [No Deformities] : no deformities [General Appearance - In No Acute Distress] : no acute distress [Normal Conjunctiva] : the conjunctiva exhibited no abnormalities [Normal Oral Mucosa] : normal oral mucosa [Eyelids - No Xanthelasma] : the eyelids demonstrated no xanthelasmas [No Oral Cyanosis] : no oral cyanosis [No Oral Pallor] : no oral pallor [Normal Jugular Venous A Waves Present] : normal jugular venous A waves present [Normal Jugular Venous V Waves Present] : normal jugular venous V waves present [No Jugular Venous Torres A Waves] : no jugular venous torres A waves [Respiration, Rhythm And Depth] : normal respiratory rhythm and effort [Exaggerated Use Of Accessory Muscles For Inspiration] : no accessory muscle use [Auscultation Breath Sounds / Voice Sounds] : lungs were clear to auscultation bilaterally [Heart Sounds] : normal S1 and S2 [Heart Rate And Rhythm] : heart rate and rhythm were normal [Murmurs] : no murmurs present [Abdomen Soft] : soft [Abdomen Tenderness] : non-tender [Abdomen Mass (___ Cm)] : no abdominal mass palpated [Abnormal Walk] : normal gait [Gait - Sufficient For Exercise Testing] : the gait was sufficient for exercise testing [Nail Clubbing] : no clubbing of the fingernails [Cyanosis, Localized] : no localized cyanosis [Petechial Hemorrhages (___cm)] : no petechial hemorrhages [] : no rash [Skin Color & Pigmentation] : normal skin color and pigmentation [No Venous Stasis] : no venous stasis [Skin Lesions] : no skin lesions [No Skin Ulcers] : no skin ulcer [No Xanthoma] : no  xanthoma was observed [Oriented To Time, Place, And Person] : oriented to person, place, and time [Affect] : the affect was normal [Mood] : the mood was normal [No Anxiety] : not feeling anxious

## 2020-01-08 LAB
ALBUMIN SERPL ELPH-MCNC: 4.5 G/DL
ALP BLD-CCNC: 87 U/L
ALT SERPL-CCNC: 16 U/L
ANION GAP SERPL CALC-SCNC: 13 MMOL/L
AST SERPL-CCNC: 19 U/L
BASOPHILS # BLD AUTO: 0.02 K/UL
BASOPHILS NFR BLD AUTO: 0.4 %
BILIRUB SERPL-MCNC: 0.2 MG/DL
BUN SERPL-MCNC: 22 MG/DL
CALCIUM SERPL-MCNC: 9.8 MG/DL
CHLORIDE SERPL-SCNC: 102 MMOL/L
CHOLEST SERPL-MCNC: 228 MG/DL
CHOLEST/HDLC SERPL: 3.4 RATIO
CO2 SERPL-SCNC: 24 MMOL/L
CREAT SERPL-MCNC: 1.02 MG/DL
EOSINOPHIL # BLD AUTO: 0.09 K/UL
EOSINOPHIL NFR BLD AUTO: 1.9 %
ESTIMATED AVERAGE GLUCOSE: 105 MG/DL
GLUCOSE SERPL-MCNC: 70 MG/DL
HBA1C MFR BLD HPLC: 5.3 %
HCT VFR BLD CALC: 46.6 %
HDLC SERPL-MCNC: 68 MG/DL
HGB BLD-MCNC: 14.7 G/DL
IMM GRANULOCYTES NFR BLD AUTO: 0.2 %
LDLC SERPL CALC-MCNC: 124 MG/DL
LYMPHOCYTES # BLD AUTO: 0.73 K/UL
LYMPHOCYTES NFR BLD AUTO: 15.4 %
MAN DIFF?: NORMAL
MCHC RBC-ENTMCNC: 30.7 PG
MCHC RBC-ENTMCNC: 31.5 GM/DL
MCV RBC AUTO: 97.3 FL
MONOCYTES # BLD AUTO: 0.55 K/UL
MONOCYTES NFR BLD AUTO: 11.6 %
NEUTROPHILS # BLD AUTO: 3.34 K/UL
NEUTROPHILS NFR BLD AUTO: 70.5 %
PLATELET # BLD AUTO: 163 K/UL
POTASSIUM SERPL-SCNC: 4.3 MMOL/L
PROT SERPL-MCNC: 6.9 G/DL
PSA FREE FLD-MCNC: 18 %
PSA FREE SERPL-MCNC: 0.12 NG/ML
PSA SERPL-MCNC: 0.67 NG/ML
RBC # BLD: 4.79 M/UL
RBC # FLD: 13 %
SODIUM SERPL-SCNC: 139 MMOL/L
T4 SERPL-MCNC: 6.5 UG/DL
TRIGL SERPL-MCNC: 179 MG/DL
TSH SERPL-ACNC: 3.36 UIU/ML
WBC # FLD AUTO: 4.74 K/UL

## 2020-10-26 ENCOUNTER — NON-APPOINTMENT (OUTPATIENT)
Age: 65
End: 2020-10-26

## 2020-12-11 ENCOUNTER — APPOINTMENT (OUTPATIENT)
Dept: INFECTIOUS DISEASE | Facility: CLINIC | Age: 65
End: 2020-12-11
Payer: MEDICARE

## 2020-12-11 VITALS
HEART RATE: 84 BPM | BODY MASS INDEX: 27.43 KG/M2 | SYSTOLIC BLOOD PRESSURE: 162 MMHG | TEMPERATURE: 98.3 F | HEIGHT: 68 IN | OXYGEN SATURATION: 99 % | WEIGHT: 181 LBS | DIASTOLIC BLOOD PRESSURE: 89 MMHG

## 2020-12-11 PROCEDURE — 99213 OFFICE O/P EST LOW 20 MIN: CPT

## 2020-12-11 NOTE — REVIEW OF SYSTEMS
[Limb Swelling] : limb swelling [As Noted in HPI] : as noted in HPI [Negative] : Heme/Lymph [Fever] : no fever [Chills] : no chills [Body Aches] : no body aches [Difficulty Sleeping] : no difficulty sleeping [Feeling Sick] : not feeling sick [Feeling Tired] : not feeling tired [FreeTextEntry9] : left leg

## 2020-12-11 NOTE — ASSESSMENT
[FreeTextEntry1] : This is a 66 yo M, prior colon cancer who presents for f/up of neurosyphilis today, s/p treatment 8/2016.\par \par Pt is s/p treatment with IV penicillin x 10 days.  Course complicated by status epilepticus and he was placed on keppra. Keppra now discontinued.  \par \par Overall pt seems much improved.  CSF studies from 3/2017 had resolution of lymphocytic pleocytosis.  VDRL remained the same at 1:8.\par \par Repeated RPR has been stable at 1:4. Suspect he is now serofast and this is his baseline.   Repeat today and defers other STD testing. If RPR rises by two-fold, will assume he has new infection and will treat again for syphilis. \par \par Will call pt with results and pt to return in 12 months for repeat RPR testing.  Pt aware to return sooner if needed.\par \par Advised to continue follow social distancing protocols and mask wearing to remain safe during covid pandemic. \par \par Time spent 15 minutes.
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
## 2020-12-11 NOTE — PHYSICAL EXAM
[General Appearance - Alert] : alert [General Appearance - In No Acute Distress] : in no acute distress [General Appearance - Well-Appearing] : healthy appearing [Sclera] : the sclera and conjunctiva were normal [PERRL With Normal Accommodation] : pupils were equal in size, round, reactive to light [Outer Ear] : the ears and nose were normal in appearance [Neck Appearance] : the appearance of the neck was normal [Auscultation Breath Sounds / Voice Sounds] : lungs were clear to auscultation bilaterally [Heart Rate And Rhythm] : heart rate was normal and rhythm regular [Heart Sounds] : normal S1 and S2 [Heart Sounds Gallop] : no gallops [Murmurs] : no murmurs [Heart Sounds Pericardial Friction Rub] : no pericardial rub [Edema] : there was no peripheral edema [Bowel Sounds] : normal bowel sounds [Abdomen Soft] : soft [Abdomen Tenderness] : non-tender [Skin Color & Pigmentation] : normal skin color and pigmentation [] : no rash [Oriented To Time, Place, And Person] : oriented to person, place, and time [FreeTextEntry1] : Left leg swelling noted

## 2020-12-15 LAB — RPR SER-TITR: ABNORMAL

## 2020-12-18 ENCOUNTER — NON-APPOINTMENT (OUTPATIENT)
Age: 65
End: 2020-12-18

## 2020-12-28 ENCOUNTER — RX RENEWAL (OUTPATIENT)
Age: 65
End: 2020-12-28

## 2021-01-04 DIAGNOSIS — Z20.828 CONTACT WITH AND (SUSPECTED) EXPOSURE TO OTHER VIRAL COMMUNICABLE DISEASES: ICD-10-CM

## 2021-01-07 LAB — SARS-COV-2 N GENE NPH QL NAA+PROBE: NOT DETECTED

## 2021-01-11 ENCOUNTER — APPOINTMENT (OUTPATIENT)
Dept: CARDIOLOGY | Facility: CLINIC | Age: 66
End: 2021-01-11

## 2021-12-06 ENCOUNTER — APPOINTMENT (OUTPATIENT)
Dept: INFECTIOUS DISEASE | Facility: CLINIC | Age: 66
End: 2021-12-06
Payer: MEDICARE

## 2021-12-06 VITALS
OXYGEN SATURATION: 96 % | BODY MASS INDEX: 28.19 KG/M2 | HEART RATE: 61 BPM | SYSTOLIC BLOOD PRESSURE: 148 MMHG | HEIGHT: 68 IN | TEMPERATURE: 98 F | WEIGHT: 186 LBS | RESPIRATION RATE: 18 BRPM | DIASTOLIC BLOOD PRESSURE: 83 MMHG

## 2021-12-06 PROCEDURE — 99213 OFFICE O/P EST LOW 20 MIN: CPT

## 2021-12-07 LAB — RPR SER-TITR: ABNORMAL
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
## 2021-12-07 NOTE — ASSESSMENT
[FreeTextEntry1] : This is a 66 yo M, prior colon cancer who presents for f/up of neurosyphilis today, s/p treatment 8/2016.\par \par Pt is s/p treatment with IV penicillin x 10 days.  Course complicated by status epilepticus and he was placed on keppra. Keppra now discontinued.  \par \par Overall pt seems much improved.  CSF studies from 3/2017 had resolution of lymphocytic pleocytosis.  VDRL remained the same at 1:8.\par \par Repeated RPR has been stable at 1:4. Suspect he is now serofast and this is his baseline.   Repeat today and defers other STD testing. If RPR rises by two-fold, will assume he has new infection and will treat again for syphilis. \par \par Will call pt with results and pt to return in 12 months for repeat RPR testing or can f/up with PCP for RPR titer.  Pt aware to return sooner if needed.\par \par Addendum:\par RPR resulted and is 1:2, improved from prior. Continue to monitor yearly.

## 2022-10-25 NOTE — PACU DISCHARGE NOTE - NAUSEA/VOMITING:
Call back mother and stated they will be trying to go to the [facility] in [place] to be evaluated, no further questions or concerns at this time.   
None
None

## 2024-12-19 NOTE — CONSULT LETTER
[Dear  ___] : Dear  [unfilled], [Consult Letter:] : I had the pleasure of evaluating your patient, [unfilled]. [Please see my note below.] : Please see my note below. [Consult Closing:] : Thank you very much for allowing me to participate in the care of this patient.  If you have any questions, please do not hesitate to contact me. [Sincerely,] : Sincerely, [FreeTextEntry2] : Dr. Erwin Stephens [FreeTextEntry3] : \par Zaina Hernandez MD\par  of Medicine\par Division of Infectious Diseases\par [facility]\par [address] [place] [address] [address]inda\[place] [address]\par Tel: [phone]\par Fax: [phone] Patient expressed no known problems or needs
